# Patient Record
Sex: FEMALE | Race: WHITE | NOT HISPANIC OR LATINO | Employment: FULL TIME | URBAN - METROPOLITAN AREA
[De-identification: names, ages, dates, MRNs, and addresses within clinical notes are randomized per-mention and may not be internally consistent; named-entity substitution may affect disease eponyms.]

---

## 2017-05-18 ENCOUNTER — ALLSCRIPTS OFFICE VISIT (OUTPATIENT)
Dept: OTHER | Facility: OTHER | Age: 42
End: 2017-05-18

## 2018-01-12 NOTE — PROCEDURES
Chief Complaint  left heel pain - here for injection - [facility]      Current Meds   1  Atenolol 25 MG Oral Tablet; Therapy: 21Jan2016 to Recorded   2  Dicyclomine HCl - 10 MG Oral Capsule; TAKE 1 CAPSULE EVERY 6 HOURS AS   NEEDED; Therapy: [id number] to (Last Rx:03Apr2015)  Requested for: 03Apr2015 Ordered    Allergies    1  Aciphex TBEC   2  Chantix TABS   3  NSAIDs   4  Penicillins    Vitals  Signs [Data Includes: Current Encounter]    Heart Rate: 76, L Radial  Pulse Quality: Regular  Systolic: 343, LUE, Sitting  Diastolic: 80, LUE, Sitting  Height: 5 ft 6 5 in  Weight: 161 lb   BMI Calculated: 25 6  BSA Calculated: 1 83    Procedure    Procedure:  Injection Of Trigger Point(s)  The injection was on the left  Indication: pain and Bone spur of the left calcaneus  Risks were discussed with the  Verbal consent was obtained prior to the procedure  The patient was premedicated with Ethyl chloride-topical    The site was prepped with Alcohol  Locations:   Procedure Note: The needle size was 21G x 5/8 inch  Anesthesia: The wound was anesthetized with i/2 ml ml of lidocaine 2%  Dressing:  a sterile dressing was placed  Patient Status:  the patient tolerated the procedure well  Complications:  there were no complications  Assessment    1  Heel spur (659 73) (M77 30)    Plan  Heel spur    · Injection Trigger Point 1 or 2 Muscles - POC; Status:Complete;   Done: [id number]  Irritable bowel syndrome    · Dicyclomine HCl - 10 MG Oral Capsule; TAKE 1 CAPSULE EVERY 6 HOURS AS  NEEDED    Discussion/Summary    #1  Heel spur left foot-injected with 20 mg of Kenalog and lidocaine  Followup 2 weeks if still bothering her        Signatures   Electronically signed by : Carlyle Valadez, [facility]; Jan 28 2016  1:47PM EST                       (Author)    Electronically signed by : Ruby Ferris DO; Jan 28 2016  2:22PM EST

## 2018-01-14 VITALS
HEIGHT: 67 IN | TEMPERATURE: 99.7 F | BODY MASS INDEX: 26.02 KG/M2 | HEART RATE: 96 BPM | WEIGHT: 165.8 LBS | SYSTOLIC BLOOD PRESSURE: 120 MMHG | DIASTOLIC BLOOD PRESSURE: 76 MMHG

## 2018-02-03 ENCOUNTER — OFFICE VISIT (OUTPATIENT)
Dept: FAMILY MEDICINE CLINIC | Facility: CLINIC | Age: 43
End: 2018-02-03

## 2018-02-03 VITALS
HEIGHT: 67 IN | SYSTOLIC BLOOD PRESSURE: 102 MMHG | WEIGHT: 166.4 LBS | DIASTOLIC BLOOD PRESSURE: 66 MMHG | HEART RATE: 104 BPM | BODY MASS INDEX: 26.12 KG/M2 | TEMPERATURE: 98.3 F

## 2018-02-03 DIAGNOSIS — J40 BRONCHITIS: Primary | ICD-10-CM

## 2018-02-03 DIAGNOSIS — K58.9 IRRITABLE BOWEL SYNDROME, UNSPECIFIED TYPE: ICD-10-CM

## 2018-02-03 DIAGNOSIS — R05.9 COUGH: ICD-10-CM

## 2018-02-03 DIAGNOSIS — E05.90 HYPERTHYROIDISM: ICD-10-CM

## 2018-02-03 PROCEDURE — 99214 OFFICE O/P EST MOD 30 MIN: CPT | Performed by: FAMILY MEDICINE

## 2018-02-03 PROCEDURE — 94640 AIRWAY INHALATION TREATMENT: CPT | Performed by: FAMILY MEDICINE

## 2018-02-03 RX ORDER — AZITHROMYCIN 250 MG/1
250 TABLET, FILM COATED ORAL DAILY
Qty: 6 TABLET | Refills: 0 | Status: SHIPPED | OUTPATIENT
Start: 2018-02-03 | End: 2018-02-08

## 2018-02-03 RX ORDER — LEVALBUTEROL 1.25 MG/.5ML
1.25 SOLUTION, CONCENTRATE RESPIRATORY (INHALATION) EVERY 8 HOURS PRN
Status: SHIPPED | OUTPATIENT
Start: 2018-02-03

## 2018-02-03 RX ADMIN — LEVALBUTEROL 1.25 MG: 1.25 SOLUTION, CONCENTRATE RESPIRATORY (INHALATION) at 09:09

## 2018-02-03 NOTE — PROGRESS NOTES
Assessment/Plan:    Patient presents today with cough and upper respiratory symptoms  She is also wheezing  1   Bronchitis -Zithromax, Neti pot, plenty of fluids  May use over-the-counter Robitussin DM or Delsym if needed  2   Cough -reactive airway, in nature  Start Advair 1 puff b i d  Nebulizer given in office today  3   Hypothyroidism -continue atenolol  Her pulse is a little elevated today  4   Irritable bowel syndrome -stable at the present time  Subjective:      Patient ID: Demetrius Bedoya is a 38 y o  female  CC:  Cough and feeling "yucky" x 2 weeks  --bb    Patient states that 2 weeks ago she developed upper respiratory infection which slowly is moved to her chest   Current smoker  No documented temp  She has a hx of tachycardia but has not been using the atenolol  The following portions of the patient's history were reviewed and updated as appropriate: allergies, current medications, past family history, past medical history, past social history, past surgical history and problem list     Review of Systems   Constitutional:        See HPI   HENT: Positive for congestion and rhinorrhea  Negative for ear pain, mouth sores, sinus pressure and trouble swallowing  Eyes: Negative for discharge, redness and itching  Respiratory: Positive for cough  Negative for apnea, chest tightness, shortness of breath, wheezing and stridor  Cardiovascular: Negative for chest pain, palpitations and leg swelling  Gastrointestinal: Negative for abdominal distention, nausea and vomiting  Endocrine: Negative for cold intolerance and heat intolerance  Genitourinary: Negative for difficulty urinating, dysuria, flank pain and urgency  Musculoskeletal: Negative for arthralgias and myalgias  Skin: Negative for rash  Neurological: Negative for dizziness, seizures, syncope, speech difficulty, weakness, light-headedness, numbness and headaches  Hematological: Negative for adenopathy  Psychiatric/Behavioral: Negative for agitation, behavioral problems, confusion and sleep disturbance  The patient is not nervous/anxious  Objective:     Vitals:    02/03/18 0816   BP: 102/66   Pulse: 104   Temp: 98 3 °F (36 8 °C)            Physical Exam   Constitutional: She is oriented to person, place, and time  She appears well-developed and well-nourished  No distress  HENT:   Head: Normocephalic and atraumatic  Right Ear: External ear normal    Left Ear: External ear normal    Mouth/Throat: Oropharyngeal exudate present  Eyes: Conjunctivae and EOM are normal  Pupils are equal, round, and reactive to light  No scleral icterus  Neck: Normal range of motion  Neck supple  Cardiovascular: Normal rate, regular rhythm, normal heart sounds and intact distal pulses  Exam reveals no gallop and no friction rub  No murmur heard  Pulmonary/Chest: Effort normal  No respiratory distress  She has wheezes  She has no rales  She exhibits no tenderness  Occasional expiratory wheeze  Post nebulizer: improved air movement and no wheezes  Abdominal: Soft  Bowel sounds are normal    Musculoskeletal: Normal range of motion  Lymphadenopathy:     She has no cervical adenopathy  Neurological: She is alert and oriented to person, place, and time  She has normal reflexes  Skin: Skin is warm and dry  She is not diaphoretic  Psychiatric: She has a normal mood and affect   Her behavior is normal  Judgment and thought content normal

## 2018-02-03 NOTE — PATIENT INSTRUCTIONS
1   Start Z-Rickie  2  Start Advair inhaler -1 puff twice a day  Please ask the pharmacist to give instructions  3  Drink lots of fluids  4  You may use Robitussin DM or Delsym, over-the-counter as needed for cough    5   Please return to office if his symptoms worsen or persist

## 2018-02-09 DIAGNOSIS — J40 BRONCHITIS: Primary | ICD-10-CM

## 2018-02-09 RX ORDER — ALBUTEROL SULFATE 90 UG/1
2 AEROSOL, METERED RESPIRATORY (INHALATION) EVERY 6 HOURS PRN
Qty: 1 INHALER | Refills: 0 | Status: SHIPPED | OUTPATIENT
Start: 2018-02-09 | End: 2018-06-26

## 2018-06-26 ENCOUNTER — OFFICE VISIT (OUTPATIENT)
Dept: FAMILY MEDICINE CLINIC | Facility: CLINIC | Age: 43
End: 2018-06-26
Payer: COMMERCIAL

## 2018-06-26 VITALS
HEART RATE: 100 BPM | HEIGHT: 67 IN | DIASTOLIC BLOOD PRESSURE: 76 MMHG | TEMPERATURE: 99.9 F | WEIGHT: 173.2 LBS | BODY MASS INDEX: 27.18 KG/M2 | SYSTOLIC BLOOD PRESSURE: 116 MMHG

## 2018-06-26 DIAGNOSIS — F17.200 TOBACCO USE DISORDER: ICD-10-CM

## 2018-06-26 DIAGNOSIS — M77.50 TENDONITIS OF ANKLE OR FOOT: ICD-10-CM

## 2018-06-26 DIAGNOSIS — R05.9 COUGH: ICD-10-CM

## 2018-06-26 DIAGNOSIS — J01.90 ACUTE NON-RECURRENT SINUSITIS, UNSPECIFIED LOCATION: Primary | ICD-10-CM

## 2018-06-26 PROCEDURE — 99214 OFFICE O/P EST MOD 30 MIN: CPT | Performed by: FAMILY MEDICINE

## 2018-06-26 PROCEDURE — 3008F BODY MASS INDEX DOCD: CPT | Performed by: FAMILY MEDICINE

## 2018-06-26 RX ORDER — AZITHROMYCIN 500 MG/1
500 TABLET, FILM COATED ORAL DAILY
Qty: 3 TABLET | Refills: 0 | Status: SHIPPED | OUTPATIENT
Start: 2018-06-26 | End: 2018-06-29

## 2018-06-26 RX ORDER — BENZONATATE 200 MG/1
200 CAPSULE ORAL 3 TIMES DAILY
Qty: 30 CAPSULE | Refills: 1 | Status: SHIPPED | OUTPATIENT
Start: 2018-06-26 | End: 2018-10-16 | Stop reason: ALTCHOICE

## 2018-06-26 NOTE — ASSESSMENT & PLAN NOTE
She will be given a referral to see either Dr Francisco Javier Baeza or Dr Lorie Salazar here in the office

## 2018-06-26 NOTE — ASSESSMENT & PLAN NOTE
She was given a prescription for Tessalon Perles 200 mg 1 3 times a day number 30 with 1 refill and Delsym 2 tsp twice a day for her cough

## 2018-06-26 NOTE — PROGRESS NOTES
Assessment/Plan:    Acute non-recurrent sinusitis  She was given a prescription for Zithromax 500 mg 1 daily #3 , Coricidin HBP cold and flu 1 tablet twice a day and 2 tablets at bedtime  Cough  She was given a prescription for Tessalon Perles 200 mg 1 3 times a day number 30 with 1 refill and Delsym 2 tsp twice a day for her cough  Tendonitis of ankle or foot  She will be given a referral to see either Dr Annelise Barron or Dr Arsh Corcoran here in the office  Tobacco use disorder  She had quit smoking but now she started up again and she is currently still smoking  Diagnoses and all orders for this visit:    Acute non-recurrent sinusitis, unspecified location  -     azithromycin (ZITHROMAX) 500 MG tablet; Take 1 tablet (500 mg total) by mouth daily for 3 days    Cough  -     benzonatate (TESSALON) 200 MG capsule; Take 1 capsule (200 mg total) by mouth 3 (three) times a day    Tendonitis of ankle or foot  -     Ambulatory referral to Podiatry; Future    Tobacco use disorder          Subjective: C/O productive cough, mild sinus congestion, and nausea  Onset of symptoms was 6/22/18  Tried otc mucinex without relief  --bb     Patient ID: Guadalupe Griffin is a 37 y o  female  This is a 52-year-old female who comes in with a productive cough with sinus congestion and excessive postnasal drip for the past 5 days  She has been taking Mucinex with no help  She also has been using her albuterol inhaler but only once a day  She denies any vomiting or diarrhea but does have nausea from the constant drainage  Her blood pressure is 116/76 and her temperature is 99 9°  The following portions of the patient's history were reviewed and updated as appropriate: allergies, current medications, past family history, past medical history, past social history, past surgical history and problem list     Review of Systems   Constitutional: Positive for fever  Negative for fatigue     HENT: Positive for congestion, postnasal drip, rhinorrhea and sinus pressure  Negative for ear pain and sore throat  Respiratory: Positive for cough  Negative for shortness of breath and wheezing  Gastrointestinal: Positive for nausea  Negative for diarrhea and vomiting  Objective:      /76 (BP Location: Left arm, Patient Position: Sitting, Cuff Size: Standard)   Pulse 100   Temp 99 9 °F (37 7 °C) (Tympanic)   Ht 5' 7" (1 702 m)   Wt 78 6 kg (173 lb 3 2 oz)   BMI 27 13 kg/m²          Physical Exam   Constitutional: She is oriented to person, place, and time  She appears well-developed and well-nourished  HENT:   Head: Normocephalic  Right Ear: External ear normal    Mouth/Throat: No oropharyngeal exudate  Left ear canal with redness but no injection or erythema of the tympanic membrane  Positive postnasal drip, +2 erythema of posterior pharynx and excessive postnasal drip  Eyes: Conjunctivae and EOM are normal  Pupils are equal, round, and reactive to light  Neck: Normal range of motion  Neck supple  Cardiovascular: Normal rate, regular rhythm and normal heart sounds  Pulmonary/Chest: Effort normal and breath sounds normal  She has no wheezes  She has no rales  Abdominal: Soft  Bowel sounds are normal    Musculoskeletal: Normal range of motion  Lymphadenopathy:     She has no cervical adenopathy  Neurological: She is alert and oriented to person, place, and time  Skin: Skin is warm  Psychiatric: She has a normal mood and affect  Her behavior is normal  Judgment and thought content normal    Nursing note and vitals reviewed

## 2018-06-26 NOTE — ASSESSMENT & PLAN NOTE
She was given a prescription for Zithromax 500 mg 1 daily #3 , Coricidin HBP cold and flu 1 tablet twice a day and 2 tablets at bedtime

## 2018-10-16 ENCOUNTER — OFFICE VISIT (OUTPATIENT)
Dept: FAMILY MEDICINE CLINIC | Facility: CLINIC | Age: 43
End: 2018-10-16
Payer: COMMERCIAL

## 2018-10-16 VITALS
HEART RATE: 100 BPM | HEIGHT: 67 IN | WEIGHT: 176 LBS | BODY MASS INDEX: 27.62 KG/M2 | SYSTOLIC BLOOD PRESSURE: 112 MMHG | DIASTOLIC BLOOD PRESSURE: 70 MMHG | TEMPERATURE: 99.6 F

## 2018-10-16 DIAGNOSIS — N90.9 LESION OF FEMALE PERINEUM: Primary | ICD-10-CM

## 2018-10-16 PROCEDURE — 99213 OFFICE O/P EST LOW 20 MIN: CPT | Performed by: FAMILY MEDICINE

## 2018-10-16 PROCEDURE — 3008F BODY MASS INDEX DOCD: CPT | Performed by: FAMILY MEDICINE

## 2018-10-16 NOTE — PROGRESS NOTES
Assessment/Plan:    Lesion of female perineum  Draining cyst of clitoral area       Diagnoses and all orders for this visit:    Lesion of female perineum  -     Ambulatory referral to Gynecology; Future          Subjective:   Pt states she has cellulitis clitoral area 3 times in the past month  It is not resolving  She states she was treated for this previously with antibiotic   - [facility]     Patient ID: Luz Mosher is a 37 y o  female  This is a 18-year-old female who comes in with a recurring cyst in the clitoral area of the perineum  She has been put on an antibiotic and the cyst resolves involving the drainage but does not completely resolved  She has a blood pressure of 112/70 and her temperature is 99 6°  Dr Suzette Penaloza examined her and thought that it was a cyst which needs to be surgically taking care of and a gyn referral was in order  The following portions of the patient's history were reviewed and updated as appropriate: allergies, current medications, past family history, past medical history, past social history, past surgical history and problem list     Review of Systems   Constitutional: Negative  HENT: Negative  Eyes: Negative  Respiratory: Negative  Cardiovascular: Negative  Gastrointestinal: Negative  Endocrine: Negative  Genitourinary: Positive for genital sores  Negative for difficulty urinating, dyspareunia, dysuria and enuresis  Musculoskeletal: Negative  Skin: Negative  Allergic/Immunologic: Negative  Neurological: Negative  Hematological: Negative  Psychiatric/Behavioral: Negative  Objective:      /70 (BP Location: Left arm, Patient Position: Sitting, Cuff Size: Large)   Pulse 100   Temp 99 6 °F (37 6 °C) (Oral)   Ht 5' 7" (1 702 m)   Wt 79 8 kg (176 lb)   BMI 27 57 kg/m²          Physical Exam   Genitourinary:   Genitourinary Comments: Cyst located in the clitoral area with no draining at the present time     Nursing note and vitals reviewed

## 2018-10-25 ENCOUNTER — OFFICE VISIT (OUTPATIENT)
Dept: OBGYN CLINIC | Facility: MEDICAL CENTER | Age: 43
End: 2018-10-25
Payer: COMMERCIAL

## 2018-10-25 VITALS — BODY MASS INDEX: 27.88 KG/M2 | WEIGHT: 178 LBS | DIASTOLIC BLOOD PRESSURE: 76 MMHG | SYSTOLIC BLOOD PRESSURE: 122 MMHG

## 2018-10-25 DIAGNOSIS — N90.89 VULVAR LESION: Primary | ICD-10-CM

## 2018-10-25 PROCEDURE — 99203 OFFICE O/P NEW LOW 30 MIN: CPT | Performed by: OBSTETRICS & GYNECOLOGY

## 2019-07-30 ENCOUNTER — OFFICE VISIT (OUTPATIENT)
Dept: FAMILY MEDICINE CLINIC | Facility: CLINIC | Age: 44
End: 2019-07-30
Payer: COMMERCIAL

## 2019-07-30 VITALS
BODY MASS INDEX: 27.31 KG/M2 | WEIGHT: 174 LBS | HEART RATE: 80 BPM | DIASTOLIC BLOOD PRESSURE: 84 MMHG | SYSTOLIC BLOOD PRESSURE: 120 MMHG | HEIGHT: 67 IN | TEMPERATURE: 99.2 F

## 2019-07-30 DIAGNOSIS — Z12.39 BREAST CANCER SCREENING: Primary | ICD-10-CM

## 2019-07-30 DIAGNOSIS — R10.31 RIGHT LOWER QUADRANT ABDOMINAL PAIN: ICD-10-CM

## 2019-07-30 PROCEDURE — 99213 OFFICE O/P EST LOW 20 MIN: CPT | Performed by: FAMILY MEDICINE

## 2019-07-30 PROCEDURE — 3008F BODY MASS INDEX DOCD: CPT | Performed by: FAMILY MEDICINE

## 2019-07-30 NOTE — ASSESSMENT & PLAN NOTE
The patient will go to the emergency room for further evaluation and treatment for a possible appendicitis

## 2019-07-30 NOTE — PATIENT INSTRUCTIONS

## 2019-07-30 NOTE — PROGRESS NOTES
Assessment and Plan:  Patient to go to ER for further evaluation and treatment  Problem List Items Addressed This Visit        Other    Right lower quadrant abdominal pain     The patient will go to the emergency room for further evaluation and treatment for a possible appendicitis  Relevant Orders    Transfer to other facility      Other Visit Diagnoses     Breast cancer screening    -  Primary    Relevant Orders    Mammo screening bilateral w 3d & cad    BMI 27 0-27 9,adult                     Diagnoses and all orders for this visit:    Breast cancer screening  -     Mammo screening bilateral w 3d & cad; Future    Right lower quadrant abdominal pain  -     Transfer to other facility    BMI 27 0-27 9,adult              Subjective:      Patient ID: Madelin Lombardo is a 53 y o  female  CC:    Chief Complaint   Patient presents with    Nausea    Abdominal Pain    Fatigue     Symptoms started Sunday  Pt notes she is very thirsty, bloated  -  [facility]       HPI:    This is a 49-year-old female who comes in after several days of abdominal pain which was very intense and severe for the 1st 2 days and is still painful but not quite as bad  The pain is located in the right lower quadrant of her abdomen  She is also complaining of fatigue and being thirsty and her abdomen being bloated  She denies any nausea or vomiting and has no appetite  Her blood pressure is 120/84 and her temperature is 99 2°  Her weight is down 4 lb from the previous visit to 174 lb  The following portions of the patient's history were reviewed and updated as appropriate: allergies, current medications, past family history, past medical history, past social history, past surgical history and problem list       Review of Systems   Constitutional: Positive for fatigue  Mild temperature elevation   HENT: Negative  Eyes: Negative  Respiratory: Negative  Negative for chest tightness  Cardiovascular: Negative  Negative for chest pain  Gastrointestinal: Positive for abdominal distention, abdominal pain and nausea  Negative for anal bleeding, blood in stool, constipation, diarrhea, rectal pain and vomiting  Right lower quadrant abdominal pain   Endocrine: Negative  Genitourinary: Negative  Musculoskeletal: Negative  Skin: Negative  Allergic/Immunologic: Negative  Neurological: Negative  Hematological: Negative  Psychiatric/Behavioral: Negative  Data to review:       Objective:    Vitals:    07/30/19 1408   BP: 120/84   BP Location: Left arm   Patient Position: Sitting   Cuff Size: Large   Pulse: 80   Temp: 99 2 °F (37 3 °C)   TempSrc: Oral   Weight: 78 9 kg (174 lb)   Height: 5' 7" (1 702 m)        Physical Exam   Constitutional: She is oriented to person, place, and time  She appears well-developed and well-nourished  HENT:   Head: Normocephalic  Right Ear: External ear normal    Left Ear: External ear normal    Mouth/Throat: Oropharynx is clear and moist    Eyes: Pupils are equal, round, and reactive to light  Conjunctivae and EOM are normal    Neck: Normal range of motion  Neck supple  Cardiovascular: Normal rate, regular rhythm and normal heart sounds  Pulmonary/Chest: Effort normal and breath sounds normal    Abdominal: Soft  Bowel sounds are normal  There is tenderness in the right lower quadrant  There is rebound and guarding  There is negative Rucker's sign  Musculoskeletal: Normal range of motion  Neurological: She is alert and oriented to person, place, and time  Skin: Skin is warm  Psychiatric: She has a normal mood and affect  Her behavior is normal  Judgment and thought content normal          BMI Counseling: Body mass index is 27 25 kg/m²  Discussed the patient's BMI with her  The BMI is above average  BMI counseling and education was provided to the patient   Nutrition recommendations include reducing portion sizes, decreasing overall calorie intake, 3-5 servings of fruits/vegetables daily, reducing fast food intake, consuming healthier snacks, decreasing soda and/or juice intake, moderation in carbohydrate intake, increasing intake of lean protein and reducing intake of cholesterol  Exercise recommendations include moderate aerobic physical activity for 150 minutes/week

## 2021-02-25 ENCOUNTER — TELEMEDICINE (OUTPATIENT)
Dept: FAMILY MEDICINE CLINIC | Facility: CLINIC | Age: 46
End: 2021-02-25
Payer: COMMERCIAL

## 2021-02-25 VITALS — HEIGHT: 67 IN | WEIGHT: 160 LBS | BODY MASS INDEX: 25.11 KG/M2

## 2021-02-25 DIAGNOSIS — E05.90 HYPERTHYROIDISM: ICD-10-CM

## 2021-02-25 DIAGNOSIS — E04.1 NONTOXIC SINGLE THYROID NODULE: ICD-10-CM

## 2021-02-25 DIAGNOSIS — Z12.31 ENCOUNTER FOR SCREENING MAMMOGRAM FOR MALIGNANT NEOPLASM OF BREAST: ICD-10-CM

## 2021-02-25 DIAGNOSIS — L24.9 IRRITANT CONTACT DERMATITIS, UNSPECIFIED TRIGGER: Primary | ICD-10-CM

## 2021-02-25 PROBLEM — R10.31 RIGHT LOWER QUADRANT ABDOMINAL PAIN: Status: RESOLVED | Noted: 2019-07-30 | Resolved: 2021-02-25

## 2021-02-25 PROBLEM — J01.90 ACUTE NON-RECURRENT SINUSITIS: Status: RESOLVED | Noted: 2018-06-26 | Resolved: 2021-02-25

## 2021-02-25 PROBLEM — R05.9 COUGH: Status: RESOLVED | Noted: 2018-06-26 | Resolved: 2021-02-25

## 2021-02-25 PROCEDURE — 3008F BODY MASS INDEX DOCD: CPT | Performed by: NURSE PRACTITIONER

## 2021-02-25 PROCEDURE — 99214 OFFICE O/P EST MOD 30 MIN: CPT | Performed by: NURSE PRACTITIONER

## 2021-02-25 PROCEDURE — 3725F SCREEN DEPRESSION PERFORMED: CPT | Performed by: NURSE PRACTITIONER

## 2021-02-25 RX ORDER — PREDNISONE 10 MG/1
TABLET ORAL
Qty: 15 TABLET | Refills: 0 | Status: SHIPPED | OUTPATIENT
Start: 2021-02-25 | End: 2021-03-02

## 2021-02-25 RX ORDER — HYDROXYZINE HYDROCHLORIDE 25 MG/1
25 TABLET, FILM COATED ORAL EVERY 6 HOURS PRN
Qty: 30 TABLET | Refills: 0 | Status: SHIPPED | OUTPATIENT
Start: 2021-02-25

## 2021-02-25 NOTE — PROGRESS NOTES
Virtual Regular Visit      Assessment/Plan:    Problem List Items Addressed This Visit        Endocrine    Hyperthyroidism     Patient was treated with medications and is no longer taking the medication  It has been some time since she has her TSH check  Will order labs  Relevant Medications    predniSONE 10 mg tablet    Other Relevant Orders    TSH, 3rd generation with Free T4 reflex    CBC and differential    Comprehensive metabolic panel    Lipid panel    US thyroid    Nontoxic single thyroid nodule     Patient last US was in 2014  She did have nodule  It has not been followed up in some times  Will re order this  Relevant Medications    predniSONE 10 mg tablet    Other Relevant Orders    US thyroid      Other Visit Diagnoses     Irritant contact dermatitis, unspecified trigger    -  Primary    Relevant Medications    predniSONE 10 mg tablet    hydrOXYzine HCL (ATARAX) 25 mg tablet    Encounter for screening mammogram for malignant neoplasm of breast        Relevant Orders    Mammo screening bilateral w 3d & cad               Reason for visit is   Chief Complaint   Patient presents with    Rash     Pt complaints of a rash all over her body for the past week  Patien describes it as bumpy and itchy   Virtual Regular Visit        Encounter provider YOUNG Clark    Provider located at [address] [place] [address] PRIMARY CARE  [facility] [address]      Recent Visits  No visits were found meeting these conditions  Showing recent visits within past 7 days and meeting all other requirements     Today's Visits  Date Type Provider Dept   02/25/21 Telemedicine YOUNG Mann [facility]   Showing today's visits and meeting all other requirements     Future Appointments  No visits were found meeting these conditions     Showing future appointments within next 150 days and meeting all other requirements        The patient was identified by name and date of birth  Keenan Bourne was informed that this is a telemedicine visit and that the visit is being conducted through [facility] [place] and patient was informed that this is not a secure, HIPAA-compliant platform  She agrees to proceed     My office door was closed  No one else was in the room  She acknowledged consent and understanding of privacy and security of the video platform  The patient has agreed to participate and understands they can discontinue the visit at any time  Patient is aware this is a billable service  Subjective  Keenan Bourne is a 50 y o  female    Patient reports she started with a rash a week ago she has really sensitive skin  She reports its started on her neck and now its down her back, buttocks and own her arms  She reports she has been using cortisone cream, benadryl, zinc oxide all without relief  The rash is very itchy          Past Medical History:   Diagnosis Date    Anxiety     LAST ASSESSED: [id number]    Bleeding hemorrhoids     LAST ASSESSED: [id number]    Chronic pharyngitis     LAST ASSESSED: [id number]    Conjunctival hemorrhage     LAST ASSESSED: [id number]    Eczema     LAST ASSESSED: [id number]    Irritable bowel     Irritable bowel syndrome     LAST ASSESSED: [id number]    Palpitations     Polyp of sigmoid colon     LAST ASSESSED: [id number]    Syncope     LAST ASSESSED: [id number]       Past Surgical History:   Procedure Laterality Date    COMBINED AUGMENTATION MAMMAPLASTY AND ABDOMINOPLASTY      HYSTERECTOMY  08/28/2014       Current Outpatient Medications   Medication Sig Dispense Refill    hydrOXYzine HCL (ATARAX) 25 mg tablet Take 1 tablet (25 mg total) by mouth every 6 (six) hours as needed for itching 30 tablet 0    predniSONE 10 mg tablet Take 5 tablets (50 mg total) by mouth daily for 1 day, THEN 4 tablets (40 mg total) daily for 1 day, THEN 3 tablets (30 mg total) daily for 1 day, THEN 2 tablets (20 mg total) daily for 1 day, THEN 1 tablet (10 mg total) daily for 1 day  15 tablet 0     Current Facility-Administered Medications   Medication Dose Route Frequency Provider Last Rate Last Admin    levalbuterol (XOPENEX) inhalation solution 1 25 mg  1 25 mg Nebulization Q8H PRN Jd Alfaro MD   1 25 mg at 02/03/18 0909        Allergies   Allergen Reactions    Rabeprazole Shortness Of Breath and Tachycardia     Other reaction(s): Hypotension, Tremor, Vomiting    Chantix  [Varenicline] Nausea Only     Other reaction(s): Anger    Nsaids      Annotation - [id number]: Avoid due to gastritis, 2004    Penicillins Hives       Review of Systems   Constitutional: Negative  Respiratory: Negative  Cardiovascular: Positive for palpitations  Skin: Positive for rash  Video Exam    Vitals:    02/25/21 1303   Weight: 72 6 kg (160 lb)   Height: 5' 7" (1 702 m)       Physical Exam  Constitutional:       General: She is not in acute distress  Appearance: She is well-developed  She is not diaphoretic  HENT:      Head: Normocephalic and atraumatic  Right Ear: External ear normal       Left Ear: External ear normal    Eyes:      Conjunctiva/sclera: Conjunctivae normal    Pulmonary:      Effort: Pulmonary effort is normal  No respiratory distress  Skin:     Findings: Rash present  Rash is papular (neck and back) and vesicular  Comments: Consistent with eczema    Neurological:      Mental Status: She is alert and oriented to person, place, and time  Psychiatric:         Thought Content: Thought content normal           I spent 9 minutes directly with the patient during this visit      VIRTUAL VISIT DISCLAIMER    Keenan Bourne acknowledges that she has consented to an online visit or consultation   She understands that the online visit is based solely on information provided by her, and that, in the absence of a face-to-face physical evaluation by the physician, the diagnosis she receives is both limited and provisional in terms of accuracy and completeness  This is not intended to replace a full medical face-to-face evaluation by the physician  Cortney Brandt understands and accepts these terms

## 2021-02-25 NOTE — ASSESSMENT & PLAN NOTE
Patient last US was in 2014  She did have nodule  It has not been followed up in some times  Will re order this

## 2021-02-25 NOTE — ASSESSMENT & PLAN NOTE
Patient was treated with medications and is no longer taking the medication  It has been some time since she has her TSH check  Will order labs

## 2021-04-06 PROCEDURE — 88305 TISSUE EXAM BY PATHOLOGIST: CPT | Performed by: STUDENT IN AN ORGANIZED HEALTH CARE EDUCATION/TRAINING PROGRAM

## 2021-04-07 ENCOUNTER — LAB REQUISITION (OUTPATIENT)
Dept: LAB | Facility: HOSPITAL | Age: 46
End: 2021-04-07
Payer: COMMERCIAL

## 2021-04-07 DIAGNOSIS — C44.519 BASAL CELL CARCINOMA OF SKIN OF OTHER PART OF TRUNK: ICD-10-CM

## 2021-04-29 ENCOUNTER — TELEMEDICINE (OUTPATIENT)
Dept: FAMILY MEDICINE CLINIC | Facility: CLINIC | Age: 46
End: 2021-04-29
Payer: COMMERCIAL

## 2021-04-29 DIAGNOSIS — U07.1 COVID-19: Primary | ICD-10-CM

## 2021-04-29 PROCEDURE — 99212 OFFICE O/P EST SF 10 MIN: CPT | Performed by: PHYSICIAN ASSISTANT

## 2021-04-29 NOTE — PROGRESS NOTES
COVID-19 Outpatient Progress Note    Assessment/Plan:    Problem List Items Addressed This Visit     None      Visit Diagnoses     COVID-19    -  Primary         Disposition:     Assessment/plan:  1  COVID-19 infection    I have spent 15 minutes directly with the patient  Assessment/plan:  1  COVID-19 infection-patient with symptom onset on the 17th of April, testing positive on the 19th of April  Her symptoms have mostly resolved several days ago  She has a very mild residual cough and loss of sense of taste or smell yet  She did not have any significant difficulties such as shortness of breath or difficulty breathing during her illness  I do believe she will be safe to return to work on Monday 5/3/2021  Note will be provided  Encounter provider Li Colón PA-C    Provider located at [address] [place] [address] PRIMARY CARE  Erwinlizzy PAYNE  [address]    Recent Visits  No visits were found meeting these conditions  Showing recent visits within past 7 days and meeting all other requirements     Today's Visits  Date Type Provider Dept   04/29/21 Telemedicine Li Colón PA-C [facility]   Showing today's visits and meeting all other requirements     Future Appointments  No visits were found meeting these conditions  Showing future appointments within next 150 days and meeting all other requirements        Patient agrees to participate in a virtual check in via telephone or video visit instead of presenting to the office to address urgent/immediate medical needs  Patient is aware this is a billable service  After connecting through Telephone, the patient was identified by name and date of birth  Luz Mosher was informed that this was a telemedicine visit and that the exam was being conducted confidentially over secure lines  My office door was closed  No one else was in the room   Luz Mosher acknowledged consent and understanding of privacy and security of the telemedicine visit  I informed the patient that I have reviewed her record in Epic and presented the opportunity for her to ask any questions regarding the visit today  The patient agreed to participate  Subjective:   Nataliia Ham is a 50 y o  female who is concerned about COVID-19  Patient is currently asymptomatic  Patient's symptoms include anosmia and loss of taste  Patient denies fever, chills, fatigue, malaise, congestion, rhinorrhea, sore throat, cough, shortness of breath, chest tightness, abdominal pain, nausea, vomiting, diarrhea, myalgias and headaches  Date of symptom onset: 4/17/2021    HPI:  This is a 55-year-old female who presents via virtual telephone visit for follow-up of COVID-19 infection  She tested positive at [facility] on the 4/19/2021  She did initially have some cold-like symptoms as well as cough, muscle aches, headache, nausea, and fatigue  She also started with some loss of taste and smell which has not returned yet  Otherwise she is feeling completely better from her symptoms except for a very mild residual cough      No results found for: NISREEN Guerrero [address]  Past Medical History:   Diagnosis Date    Anxiety     LAST ASSESSED: [id number]    Bleeding hemorrhoids     LAST ASSESSED: [id number]    Chronic pharyngitis     LAST ASSESSED: [id number]    Conjunctival hemorrhage     LAST ASSESSED: [id number]    Eczema     LAST ASSESSED: 06VNP5545    Irritable bowel     Irritable bowel syndrome     LAST ASSESSED: [id number]    Palpitations     Polyp of sigmoid colon     LAST ASSESSED: [id number]    Syncope     LAST ASSESSED: [id number]     Past Surgical History:   Procedure Laterality Date    COMBINED AUGMENTATION MAMMAPLASTY AND ABDOMINOPLASTY      HYSTERECTOMY  08/28/2014     Current Outpatient Medications   Medication Sig Dispense Refill    hydrOXYzine HCL (ATARAX) 25 mg tablet Take 1 tablet (25 mg total) by mouth every 6 (six) hours as needed for itching 30 tablet 0     Current Facility-Administered Medications   Medication Dose Route Frequency Provider Last Rate Last Admin    levalbuterol (XOPENEX) inhalation solution 1 25 mg  1 25 mg Nebulization Q8H PRN Cheri Mabry MD   1 25 mg at 02/03/18 0909     Allergies   Allergen Reactions    Rabeprazole Shortness Of Breath and Tachycardia     Other reaction(s): Hypotension, Tremor, Vomiting    Chantix  [Varenicline] Nausea Only     Other reaction(s): Anger    Nsaids      Annotation - [id number]: Avoid due to gastritis, 2004    Penicillins Hives       Review of Systems   Constitutional: Negative for chills, fatigue and fever  HENT: Negative for congestion, rhinorrhea and sore throat  Respiratory: Negative for cough, chest tightness and shortness of breath  Gastrointestinal: Negative for abdominal pain, diarrhea, nausea and vomiting  Musculoskeletal: Negative for myalgias  Neurological: Negative for headaches  Objective: There were no vitals filed for this visit  Physical Exam  Constitutional:       General: She is not in acute distress  Pulmonary:      Effort: No respiratory distress  Neurological:      Mental Status: She is alert and oriented to person, place, and time  Psychiatric:         Mood and Affect: Mood normal          Thought Content: Thought content normal          Judgment: Judgment normal        VIRTUAL VISIT DISCLAIMER    Mason Artist acknowledges that she has consented to an online visit or consultation  She understands that the online visit is based solely on information provided by her, and that, in the absence of a face-to-face physical evaluation by the physician, the diagnosis she receives is both limited and provisional in terms of accuracy and completeness  This is not intended to replace a full medical face-to-face evaluation by the physician  Mumtaz Brandt understands and accepts these terms

## 2021-04-29 NOTE — LETTER
April 29, 2021     Patient: Jamey Brandt   YOB: 1975   Date of Visit: 4/29/2021       To Whom it May Concern:    Corrie Escobedo is under my professional care  She was seen via virtual visit on 4/29/2021  She is improving from symptoms of COVID-19 infection and may return to work on Monday 5/3/2021 without restriction  She is no longer infectious to others  If you have any questions or concerns, please don't hesitate to call           Sincerely,          Joy Mittal PA-C        CC: No Recipients

## 2021-06-19 NOTE — PROGRESS NOTES
Dorys Eubanks was seen today for consult  Diagnoses and all orders for this visit:    Vulvar lesion  -     Genital Comprehensive Culture         Plan  Warm compresses and topical, triple antibiotic  If culture shows infection, will treat with antibiotics  Danny   Aisha Helm is a 37 y o  female here for a problem visit  Patient is complaining of vulvar lump  Sx's started beginning of October  Has a prior history of similar lesions in the past   Started age 10 and would occur every 1-5 years  States this lump in on the left side of labia majora near clitoris  Denies pain, bleeding, discharge and draining  Patient Active Problem List   Diagnosis    Genital herpes simplex    Hyperthyroidism    Irritable bowel syndrome    Nontoxic single thyroid nodule    Palpitations    Thyrotoxicosis    Tachycardia    Tobacco use disorder    Acute non-recurrent sinusitis    Cough    Tendonitis of ankle or foot    Lesion of female perineum       Gynecologic History  No LMP recorded  Patient has had a hysterectomy      Past Medical History:   Diagnosis Date    Anxiety     LAST ASSESSED: [id number]    Bleeding hemorrhoids     LAST ASSESSED: [id number]    Chronic pharyngitis     LAST ASSESSED: [id number]    Conjunctival hemorrhage     LAST ASSESSED: 17VLT8426    Eczema     LAST ASSESSED: 61SKE6624    Irritable bowel     Irritable bowel syndrome     LAST ASSESSED: 14SRY2395    Palpitations     Polyp of sigmoid colon     LAST ASSESSED: 60IKZ5156    Syncope     LAST ASSESSED: 52LZG9581     Past Surgical History:   Procedure Laterality Date    COMBINED AUGMENTATION MAMMAPLASTY AND ABDOMINOPLASTY      HYSTERECTOMY  08/28/2014     Family History   Problem Relation Age of Onset    Diabetes Family      Social History     Social History    Marital status:      Spouse name: N/A    Number of children: N/A    Years of education: N/A     Occupational History    OFFICE WORK      Social History Main Topics    Smoking status: Current Every Day Smoker     Packs/day: 0 50     Last attempt to quit: 1/1/2016    Smokeless tobacco: Never Used    Alcohol use No    Drug use: No    Sexual activity: Not Currently     Other Topics Concern    Not on file     Social History Narrative    No narrative on file     Allergies   Allergen Reactions    Rabeprazole Shortness Of Breath and Tachycardia     Other reaction(s): Hypotension, Tremor, Vomiting    Chantix  [Varenicline] Nausea Only     Other reaction(s): Anger    Nsaids      Annotation - [id number]: Avoid due to gastritis, 2004    Penicillins Hives     No current outpatient prescriptions on file  Current Facility-Administered Medications:     levalbuterol (XOPENEX) inhalation solution 1 25 mg, 1 25 mg, Nebulization, Q8H PRN, Jocelynn Donaldson MD, 1 25 mg at 02/03/18 0909    Review of Systems  Constitutional :no fever, feels well, no tiredness, no recent weight gain or loss  ENT: no ear ache, no loss of hearing, no nosebleeds or nasal discharge, no sore throat or hoarseness  Cardiovascular: no complaints of slow or fast heart beat, no chest pain, no palpitations, no leg claudication or lower extremity edema  Respiratory: no complaints of shortness of shortness of breath, no CALHOUN  Breasts:no complaints of breast pain, breast lump, or nipple discharge  Gastrointestinal: no complaints of abdominal pain, constipation, nausea, vomiting, or diarrhea or bloody stools  Genitourinary : no complaints of dysuria, incontinence, pelvic pain, no dysmenorrhea, vaginal discharge or abnormal vaginal bleeding and as noted in HPI  Musculoskeletal: no complaints of arthralgia, no myalgia, no joint swelling or stiffness, no limb pain or swelling    Integumentary: no complaints of skin rash or lesion, itching or dry skin  Neurological: no complaints of headache, no confusion, no numbness or tingling, no dizziness or fainting     Objective     /76   Wt 80 7 kg (178 lb)   BMI 27 88 kg/m²     General:   appears stated age, cooperative, alert normal mood and affect   Neck: normal, supple,trachea midline, no masses   Heart: regular rate and rhythm, S1, S2 normal, no murmur, click, rub or gallop   Lungs: clear to auscultation bilaterally   Abdomen: soft, non-tender, without masses or organomegaly   Vulva: 5 mm cyst on vulva near clitoris, spontaneous draining purulent discharge, cultured    Vagina: normal vagina, no discharge, exudate, lesion, or erythema   Urethra: normal   Cervix: Not evaluated   Uterus: Not evaluated   Adnexa: Not evaluated   Lymphatic palpation of lymph nodes in neck, axilla, groin and/or other locations: no lymphadenopathy or masses noted   Skin normal skin turgor and no rashes     Psychiatric orientation to person, place, and time: normal  mood and affect: normal Right arm;

## 2022-08-22 ENCOUNTER — EVALUATION (OUTPATIENT)
Dept: PHYSICAL THERAPY | Facility: REHABILITATION | Age: 47
End: 2022-08-22
Payer: COMMERCIAL

## 2022-08-22 DIAGNOSIS — R39.15 URINARY URGENCY: ICD-10-CM

## 2022-08-22 DIAGNOSIS — N39.41 URGE URINARY INCONTINENCE: ICD-10-CM

## 2022-08-22 DIAGNOSIS — N81.83 WEAKENING OF RECTOVAGINAL TISSUE: Primary | ICD-10-CM

## 2022-08-22 PROCEDURE — 97530 THERAPEUTIC ACTIVITIES: CPT | Performed by: PHYSICAL THERAPIST

## 2022-08-22 PROCEDURE — 97162 PT EVAL MOD COMPLEX 30 MIN: CPT | Performed by: PHYSICAL THERAPIST

## 2022-08-22 NOTE — LETTER
2022    DO Luis M Alan [address] [facility] [address]    Patient: Kyler Sánchez   YOB: 1975   Date of Visit: 2022     Encounter Diagnosis     ICD-10-CM    1  Weakening of rectovaginal tissue  N81 83    2  Urinary urgency  R39 15    3  Urge urinary incontinence  N39 41        Dear Dr Brayan Desai: Thank you for your recent referral of Kyler Sánchez  Please review the attached evaluation summary from Rhonda's recent visit  Please verify that you agree with the plan of care by signing the attached order  If you have any questions or concerns, please do not hesitate to call  I sincerely appreciate the opportunity to share in the care of one of your patients and hope to have another opportunity to work with you in the near future  Sincerely,    Jass Núñez, PT      Referring Provider:      I certify that I have read the below Plan of Care and certify the need for these services furnished under this plan of treatment while under my care  DO Luis M Alan [phone] [facility] [address]  Via Fax: [phone]          PT Evaluation     Today's date: 2022  Patient name: Kyler Sánchez  : 1975  MRN: [id number]  Referring provider: Monica Lake DO  Dx:   Encounter Diagnosis     ICD-10-CM    1  Weakening of rectovaginal tissue  N81 83    2  Urinary urgency  R39 15    3  Urge urinary incontinence  N39 41        Start Time: 1705  Stop Time: 1800  Total time in clinic (min): 55 minutes    Assessment  Assessment details: Kyler Sánchez is a 50 y o  female who presents with concerns of  Urinary urgency, urge incontinence and mild pelvic organ prolapse  Assessment reveals pfm weakness with poor holding endurance, poor knowledge of protective strategies, and difficulty volitionally relaxing LA   Patient presents with the below outlined deficits and is appropriate for skilled physical therapy in order to address deficits and ultimately meet goal of independent self management of condition  Therapeutic activities performed upon examination included education regarding pelvic floor anatomy, explanation of exam technique, explanation of exam findings and discussion of treatment plan as well as expectations of the patient to emphasize the importance of compliance and adherence to physical therapy visits  Impairments: abnormal muscle tone, activity intolerance, impaired physical strength and lacks appropriate home exercise program    Goals  STGs to be met in 4 weeks:  * Patient will be compliant with introductory HEP as prescribed  * Patient presents with good understanding of pelvic floor protective strategies to reduce intra-abdominal pressure against pelvic organs  * Patient will demonstrate and report good postural techniques with toileting  LTGs to be met by discharge:  * Patient will present with a  50% improvement on FOTO score by discharge to indicate improved symptom management  * Patient reports that she is able to successfully suppress an urge to empty her bladder for 20' without leakage  * Normalize sEMG findings to indicate strength average > 12uV and resting average < 2 5uV  * Presents with improved nocturia to 1 toiletings/night for more thorough sleep  * Demonstrates correct isolation and relaxation of pelvic floor to palpation without overflow from global stabilizers  * Patient will use pelvic floor muscles correctly during functional ADLs such as coughing, sneezing, lifting and exercise activities to avoid excessive IAP and PFM strain  * Patient will be compliant with comprehensive home exercise program for self management of condition           Plan  Patient would benefit from: skilled physical therapy  Referral necessary: No  Planned modality interventions: biofeedback  Planned therapy interventions: manual therapy, neuromuscular re-education, patient education, strengthening, therapeutic activities, therapeutic exercise, home exercise program and breathing training  Frequency: 1x week  Duration in weeks: 12  Plan of Care beginning date: 8/22/2022  Plan of Care expiration date: 11/14/2022  Treatment plan discussed with: patient        PT Pelvic Floor Subjective:   History of Present Illness:   Patient reports that she has had "years" bladder urgency but in the past year this has worsened with urge leakage  She notes frequency has also increased and she has very little tolerance to coffee or alcohol with bladder urgency  ("I timed it and it's every 7 minutes if I drink coffee or alcohol ") She also feel a "bubble" rectally with flatulence       Mechanism of injury: childbirth          Recurrent probem    Quality of life: good    Social Support:     Relationship status: domestic partnership    Work status: employed full time ( at [employer])    Life stress severity: mild and moderate  Hand dominance:  Right  Diet and Exercise:    Diet:balanced nutrition    Walks 1 1/2 miles daily  Home exercises   OB/ gyn History    Gestational History:     Prior Pregnancy: Yes      Number of prior pregnancies: 1    Number of term pregnancies: 1    Delivery Type: vaginal delivery      Delivery Complications:  45/31/7434 - 8 lb 8oz, tearing with repair, forceps delivery     Menstrual History:      Menstrual irregularities irregular menses  hormone replacement therapy  Partial hysterectomy in 2014 - HPV and cervical scar tissue   Estradiol patches are changed twice weekly - prescribed in November 2021   Bladder Function:     Voiding Difficulties positive for: urgency, hesitancy and incomplete emptying      Voiding Difficulties comments:     Voiding frequency: every 1-2 hours and every 3-4 hours    Urinary leakage: urine leakage    Urinary leakage aggravated by: walking to the bathroom and 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    Nocturia (episodes per night): 1, 2, 3 and 4 (can fall back to sleep easily)    Painful urination: No      Intake (ounces): Water intake (oz): 120 ounces  Bowel Function:     Bowel Function comments:  Reports recent marked improvement in bowels  She has dealt with years of diarrhea and in the past 6 months she has increased water intake, has increased fruit and vegetable intake and has moved to an intermittent fasting eating pattern  BMs are now formed, pain-free and with less strain  She was educated to utilize a squatty potty and expressed understanding  Bowel frequency: every 2 days and daily  Sexual Function:     Sexually Active:  Sexually active    Pain during intercourse: No      Lubrication Use: No      pain does not cause abstinence  Pain:     No pain reported by patient  Diagnostic Tests:     None    Treatments:     None    Patient Goals:     Patient goals for therapy:  Relaxation, return to sport/leisure activities, improved comfort and improved bladder or bowel function    Other patient goals:  Decrease bladder frequency and urgency      Objective   Pelvic Floor Exam   Position: supine exam    Diastatis   Diastasis recti present? no    General Perineum Exam:   perineum intact  Positive for descent and no pelvic organ prolapse at rest      General perineum exam comments: No discharge, irritation, organ prolapse or skin breakdown evident  Sensation intact throughout vaginal introitus  Very mild posterior POP evident with bearing down   No prolapse evident at rest          Visual Inspection of Perineum:   Excursion of perineal body in cephalad direction with contraction of pelvic floor muscles (PFM): fair   Excursion of perineal body in caudal direction with relaxation of pelvic floor muscles (PFM): weak  Cotton swab test: non-tender  Cough reflex: cough reflex  Sphincter Tone Resting: normal  Sphincter Tone Squeeze: normal    Pelvic Organ Prolapse   Position: hook-lying  At rest: none  With bearing down: mild (>1cm from hymenal remnants)  Location: posterior    Pelvic Floor Muscle Exam Palpation   No increased muscle tension in the pubococcygeus, iIliococcygeus and periurethral    Muscle Contraction: overflow  Breathing pattern with contraction: within normal limits  Pelvic floor muscle relaxation is incomplete     40% pelvic floor relaxation    PERFECT Score   Power right: 2+/5  Power left: 2+/5  Endurance (seconds to max): 4  Repetitions (before fatigue): 4    SMEG Biofeedback   to be assessed next treatment        Flowsheet Rows    Flowsheet Row Most Recent Value   PT/OT G-Codes    Current Score 13   Projected Score 0             Precautions: standard      Manuals 8/22                                                                Neuro Re-Ed                                                                                                        Ther Ex                                                                                                                     Ther Activity             education anatomy, POC x10'            Protective strategies pressure mgt, bowel mechanics x10'            Gait Training                                       Modalities

## 2022-08-22 NOTE — PROGRESS NOTES
PT Evaluation     Today's date: 2022  Patient name: Abner Mata  : 1975  MRN: [id number]  Referring provider: Ministerio Rhodes DO  Dx:   Encounter Diagnosis     ICD-10-CM    1  Weakening of rectovaginal tissue  N81 83    2  Urinary urgency  R39 15    3  Urge urinary incontinence  N39 41        Start Time: 1705  Stop Time: 1800  Total time in clinic (min): 55 minutes    Assessment  Assessment details: Abner Mata is a 50 y o  female who presents with concerns of  Urinary urgency, urge incontinence and mild pelvic organ prolapse  Assessment reveals pfm weakness with poor holding endurance, poor knowledge of protective strategies, and difficulty volitionally relaxing LA  Patient presents with the below outlined deficits and is appropriate for skilled physical therapy in order to address deficits and ultimately meet goal of independent self management of condition  Therapeutic activities performed upon examination included education regarding pelvic floor anatomy, explanation of exam technique, explanation of exam findings and discussion of treatment plan as well as expectations of the patient to emphasize the importance of compliance and adherence to physical therapy visits  Impairments: abnormal muscle tone, activity intolerance, impaired physical strength and lacks appropriate home exercise program    Goals  STGs to be met in 4 weeks:  * Patient will be compliant with introductory HEP as prescribed  * Patient presents with good understanding of pelvic floor protective strategies to reduce intra-abdominal pressure against pelvic organs  * Patient will demonstrate and report good postural techniques with toileting  LTGs to be met by discharge:  * Patient will present with a  50% improvement on FOTO score by discharge to indicate improved symptom management  * Patient reports that she is able to successfully suppress an urge to empty her bladder for 20' without leakage     * Normalize sEMG findings to indicate strength average > 12uV and resting average < 2 5uV  * Presents with improved nocturia to 1 toiletings/night for more thorough sleep  * Demonstrates correct isolation and relaxation of pelvic floor to palpation without overflow from global stabilizers  * Patient will use pelvic floor muscles correctly during functional ADLs such as coughing, sneezing, lifting and exercise activities to avoid excessive IAP and PFM strain  * Patient will be compliant with comprehensive home exercise program for self management of condition  Plan  Patient would benefit from: skilled physical therapy  Referral necessary: No  Planned modality interventions: biofeedback  Planned therapy interventions: manual therapy, neuromuscular re-education, patient education, strengthening, therapeutic activities, therapeutic exercise, home exercise program and breathing training  Frequency: 1x week  Duration in weeks: 12  Plan of Care beginning date: 8/22/2022  Plan of Care expiration date: 11/14/2022  Treatment plan discussed with: patient        PT Pelvic Floor Subjective:   History of Present Illness:   Patient reports that she has had "years" bladder urgency but in the past year this has worsened with urge leakage  She notes frequency has also increased and she has very little tolerance to coffee or alcohol with bladder urgency  ("I timed it and it's every 7 minutes if I drink coffee or alcohol ") She also feel a "bubble" rectally with flatulence       Mechanism of injury: childbirth          Recurrent probem    Quality of life: good    Social Support:     Relationship status: domestic partnership    Work status: employed full time ( at [employer])    Life stress severity: mild and moderate  Hand dominance:  Right  Diet and Exercise:    Diet:balanced nutrition    Walks 1 1/2 miles daily  Home exercises   OB/ gyn History    Gestational History:     Prior Pregnancy: Yes      Number of prior pregnancies: 1    Number of term pregnancies: 1    Delivery Type: vaginal delivery      Delivery Complications:  52/09/0440 - 8 lb 8oz, tearing with repair, forceps delivery     Menstrual History:      Menstrual irregularities irregular menses  hormone replacement therapy  Partial hysterectomy in 2014 - HPV and cervical scar tissue   Estradiol patches are changed twice weekly - prescribed in November 2021   Bladder Function:     Voiding Difficulties positive for: urgency, hesitancy and incomplete emptying      Voiding Difficulties comments:     Voiding frequency: every 1-2 hours and every 3-4 hours    Urinary leakage: urine leakage    Urinary leakage aggravated by: walking to the bathroom and 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    Nocturia (episodes per night): 1, 2, 3 and 4 (can fall back to sleep easily)    Painful urination: No      Intake (ounces): Water intake (oz): 120 ounces  Bowel Function:     Bowel Function comments:  Reports recent marked improvement in bowels  She has dealt with years of diarrhea and in the past 6 months she has increased water intake, has increased fruit and vegetable intake and has moved to an intermittent fasting eating pattern  BMs are now formed, pain-free and with less strain  She was educated to utilize a squatty potty and expressed understanding  Bowel frequency: every 2 days and daily  Sexual Function:     Sexually Active:  Sexually active    Pain during intercourse: No      Lubrication Use: No      pain does not cause abstinence  Pain:     No pain reported by patient    Diagnostic Tests:     None    Treatments:     None    Patient Goals:     Patient goals for therapy:  Relaxation, return to sport/leisure activities, improved comfort and improved bladder or bowel function    Other patient goals:  Decrease bladder frequency and urgency      Objective   Pelvic Floor Exam   Position: supine exam    Diastatis   Diastasis recti present? no    General Perineum Exam:   perineum intact  Positive for descent and no pelvic organ prolapse at rest      General perineum exam comments: No discharge, irritation, organ prolapse or skin breakdown evident  Sensation intact throughout vaginal introitus  Very mild posterior POP evident with bearing down  No prolapse evident at rest          Visual Inspection of Perineum:   Excursion of perineal body in cephalad direction with contraction of pelvic floor muscles (PFM): fair   Excursion of perineal body in caudal direction with relaxation of pelvic floor muscles (PFM): weak  Cotton swab test: non-tender  Cough reflex: cough reflex  Sphincter Tone Resting: normal  Sphincter Tone Squeeze: normal    Pelvic Organ Prolapse   Position: hook-lying  At rest: none  With bearing down: mild (>1cm from hymenal remnants)  Location: posterior    Pelvic Floor Muscle Exam     Palpation   No increased muscle tension in the pubococcygeus, iIliococcygeus and periurethral    Muscle Contraction: overflow  Breathing pattern with contraction: within normal limits  Pelvic floor muscle relaxation is incomplete     40% pelvic floor relaxation    PERFECT Score   Power right: 2+/5  Power left: 2+/5  Endurance (seconds to max): 4  Repetitions (before fatigue): 4    SMEG Biofeedback   to be assessed next treatment        Flowsheet Rows    Flowsheet Row Most Recent Value   PT/OT G-Codes    Current Score 13   Projected Score 0             Precautions: standard      Manuals 8/22                                                                Neuro Re-Ed                                                                                                        Ther Ex                                                                                                                     Ther Activity             education anatomy, POC x10'            Protective strategies pressure mgt, bowel mechanics x10'            Gait Training                                       Modalities

## 2022-08-29 ENCOUNTER — OFFICE VISIT (OUTPATIENT)
Dept: PHYSICAL THERAPY | Facility: REHABILITATION | Age: 47
End: 2022-08-29
Payer: COMMERCIAL

## 2022-08-29 DIAGNOSIS — N81.83 WEAKENING OF RECTOVAGINAL TISSUE: Primary | ICD-10-CM

## 2022-08-29 DIAGNOSIS — N39.41 URGE URINARY INCONTINENCE: ICD-10-CM

## 2022-08-29 DIAGNOSIS — R39.15 URINARY URGENCY: ICD-10-CM

## 2022-08-29 PROCEDURE — 97112 NEUROMUSCULAR REEDUCATION: CPT

## 2022-08-29 PROCEDURE — 97110 THERAPEUTIC EXERCISES: CPT

## 2022-08-29 PROCEDURE — 97530 THERAPEUTIC ACTIVITIES: CPT

## 2022-08-29 NOTE — PROGRESS NOTES
Daily Note     Today's date: 2022  Patient name: Byron Nascimento  : 1975  MRN: [id number]  Referring provider: Paradise Morton DO  Dx:   Encounter Diagnosis     ICD-10-CM    1  Weakening of rectovaginal tissue  N81 83    2  Urinary urgency  R39 15    3  Urge urinary incontinence  N39 41        Start Time: 1719  Stop Time: 1800  Total time in clinic (min): 41 minutes   Patient reports that she has had "years" bladder urgency but in the past year this has worsened with urge leakage  She notes frequency has also increased and she has very little tolerance to coffee or alcohol with bladder urgency  ("I timed it and it's every 7 minutes if I drink coffee or alcohol ") She also feel a "bubble" rectally with flatulence         Subjective: Pt denies any changes since IE  "Tired of waking up at night"  Objective: See treatment diary below    Surface EMG biofeedback was used to augment rest tone and was performed in hooklying and sitting position  Patient presents with 4 0 uV resting activity level at baseline   Set#1: 5 sec active PFM contractions followed by 10 sec of rest for 10 repetitions  Average muscle activity during work phase measured 15 4uV, with the goal being > 12 0uV; average muscle activity during rest phase measured 2 9 uV with the goal being < 2 5uV   Set#2: Performed 10 sec active PFM contractions followed by 4 sec of rest for 5 repetitions  Average muscle activity during work phase measured 15 5uV, with the goal being > 12 0uV; average muscle activity during rest phase measured 2  7uV with the goal being < 2 5uV   Set#3: Performed sitting 5 sec active PFM contractions followed by 10 sec of rest for 10 repetitions  Average muscle activity during work phase measured 5 6uV, with the goal being > 12 0uV; average muscle activity during rest phase measured 0  3uV with the goal being < 2 5uV  Assessment: Tolerated treatment well   Patient would benefit from continued PT  SEMG initially revealed elevated resting tone and delayed rest after contraction but was able to improve  Educated in how tension in her pelvic floor muscles can aggravate her symptoms  Also reviewed urge deferral strategy to implement during the day and night  Goals  STGs to be met in 4 weeks:  * Patient will be compliant with introductory HEP as prescribed  * Patient presents with good understanding of pelvic floor protective strategies to reduce intra-abdominal pressure against pelvic organs  * Patient will demonstrate and report good postural techniques with toileting  LTGs to be met by discharge:  * Patient will present with a  50% improvement on FOTO score by discharge to indicate improved symptom management  * Patient reports that she is able to successfully suppress an urge to empty her bladder for 20' without leakage  * Normalize sEMG findings to indicate strength average > 12uV and resting average < 2 5uV  * Presents with improved nocturia to 1 toiletings/night for more thorough sleep  * Demonstrates correct isolation and relaxation of pelvic floor to palpation without overflow from global stabilizers  * Patient will use pelvic floor muscles correctly during functional ADLs such as coughing, sneezing, lifting and exercise activities to avoid excessive IAP and PFM strain  * Patient will be compliant with comprehensive home exercise program for self management of condition  Plan: Continue per plan of care        Precautions: standard      Manuals 8/22 8/29                                                               Neuro Re-Ed             Biofeedback  23'           DB                                                                              Ther Ex             Nustep  L4 8'                                                                                                      Ther Activity             education anatomy, POC x10'            Protective strategies pressure mgt, bowel mechanics x10' Urge defferal            Gait Training                                       Modalities

## 2022-09-07 ENCOUNTER — TELEPHONE (OUTPATIENT)
Dept: ADMINISTRATIVE | Facility: OTHER | Age: 47
End: 2022-09-07

## 2022-09-07 ENCOUNTER — OFFICE VISIT (OUTPATIENT)
Dept: FAMILY MEDICINE CLINIC | Facility: CLINIC | Age: 47
End: 2022-09-07
Payer: COMMERCIAL

## 2022-09-07 VITALS
SYSTOLIC BLOOD PRESSURE: 112 MMHG | HEART RATE: 80 BPM | WEIGHT: 175 LBS | BODY MASS INDEX: 27.47 KG/M2 | DIASTOLIC BLOOD PRESSURE: 68 MMHG | OXYGEN SATURATION: 98 % | HEIGHT: 67 IN

## 2022-09-07 DIAGNOSIS — Z12.31 ENCOUNTER FOR SCREENING MAMMOGRAM FOR BREAST CANCER: Primary | ICD-10-CM

## 2022-09-07 DIAGNOSIS — E05.90 HYPERTHYROIDISM: ICD-10-CM

## 2022-09-07 DIAGNOSIS — M77.50 TENDONITIS OF ANKLE OR FOOT: ICD-10-CM

## 2022-09-07 DIAGNOSIS — K58.9 IRRITABLE BOWEL SYNDROME, UNSPECIFIED TYPE: ICD-10-CM

## 2022-09-07 DIAGNOSIS — Z13.220 LIPID SCREENING: ICD-10-CM

## 2022-09-07 DIAGNOSIS — R00.2 PALPITATIONS: ICD-10-CM

## 2022-09-07 DIAGNOSIS — N39.41 URGE INCONTINENCE OF URINE: ICD-10-CM

## 2022-09-07 DIAGNOSIS — C44.92 SQUAMOUS CELL CARCINOMA OF SKIN: ICD-10-CM

## 2022-09-07 DIAGNOSIS — E55.9 VITAMIN D DEFICIENCY: ICD-10-CM

## 2022-09-07 DIAGNOSIS — F17.200 TOBACCO USE DISORDER: ICD-10-CM

## 2022-09-07 DIAGNOSIS — R53.83 FATIGUE, UNSPECIFIED TYPE: ICD-10-CM

## 2022-09-07 PROBLEM — R32 URINARY INCONTINENCE: Status: ACTIVE | Noted: 2022-09-07

## 2022-09-07 PROCEDURE — 99396 PREV VISIT EST AGE 40-64: CPT | Performed by: FAMILY MEDICINE

## 2022-09-07 PROCEDURE — 3725F SCREEN DEPRESSION PERFORMED: CPT | Performed by: FAMILY MEDICINE

## 2022-09-07 PROCEDURE — 99214 OFFICE O/P EST MOD 30 MIN: CPT | Performed by: FAMILY MEDICINE

## 2022-09-07 RX ORDER — CELECOXIB 200 MG/1
200 CAPSULE ORAL DAILY
Qty: 30 CAPSULE | Refills: 0 | Status: SHIPPED | OUTPATIENT
Start: 2022-09-07 | End: 2022-10-06 | Stop reason: SDUPTHER

## 2022-09-07 RX ORDER — MELATONIN
1000 DAILY
Start: 2022-09-07

## 2022-09-07 RX ORDER — ESTRADIOL 0.05 MG/D
PATCH, EXTENDED RELEASE TRANSDERMAL
COMMUNITY
Start: 2022-08-15

## 2022-09-07 NOTE — ASSESSMENT & PLAN NOTE
Recommend that she continue follow-up with PT, as well as her gyn  No changes at the moment  She did note that the patches made a minor amount of changes to her symptoms

## 2022-09-07 NOTE — ASSESSMENT & PLAN NOTE
Vitamin-D was only minimally low  I would recommend starting supplementation, as well as recheck in 3 months  This may be partially responsible for her fatigue

## 2022-09-07 NOTE — PATIENT INSTRUCTIONS
Problem List Items Addressed This Visit       Fatigue     Patient has a significant amount of fatigue throughout the day  She does have unrefreshing sleep and daytime sleepiness, but I am wondering if this is related to her frequent urination at night and the poor sleep that she gets  Will look to see if that improves with the physical therapy  If she is having less times where she has to get up and urinate, should be somewhat better with that  As she was discussing the possibility of B12 deficiency, will check a B12 level, but her last CBC was normal and she has not had intestinal surgery prior  Relevant Orders    Vitamin B12    Hyperthyroidism     TSH was normal   Will follow in the future  Irritable bowel syndrome     Patient reports that with changes in her diet, i e  Increased fiber and increased fluids, mostly water, she is much better with this, and not really having any particular problems  Follow-up only as needed  Palpitations     Patient following with Cardiology  [facility]  Will wait to see what their letter has as far as follow-up and recommendations  I would certainly caution has about stimulants, such as Ginkoba lobe and Ashwaganda  Relevant Orders    Comprehensive metabolic panel    Squamous cell carcinoma of skin     Continue yearly follow-up with Dermatology or more often as appropriate per their recommendations  Tendonitis of ankle or foot     Patient does have what appears to be a very good description of plantar fasciitis on the left foot  Recommend quarter-inch felt heel lift  Certainly, would recommend sneakers with adequate support and cushioning  Trial nonsteroidal anti-inflammatories, Celebrex 200 mg a day  Could consider follow-up with podiatry, as well as injections, and consider physical therapy             Relevant Medications    celecoxib (CeleBREX) 200 mg capsule    Tobacco use disorder     Patient reports she quit smoking cigarettes, but does still vape on occasion  She is commended for quitting smoking, but I would caution her about vapes, which she does understand already  Urinary incontinence     Recommend that she continue follow-up with PT, as well as her gyn  No changes at the moment  She did note that the patches made a minor amount of changes to her symptoms  Vitamin D deficiency     Vitamin-D was only minimally low  I would recommend starting supplementation, as well as recheck in 3 months  This may be partially responsible for her fatigue  Relevant Medications    cholecalciferol (VITAMIN D3) 1,000 units tablet    Other Relevant Orders    Vitamin D 25 hydroxy          Other Visit Diagnoses       Encounter for screening mammogram for breast cancer    -  Primary    Relevant Orders    Mammo screening bilateral w 3d & cad    Lipid screening        Relevant Orders    Comprehensive metabolic panel    Lipid panel            COVID 19 Instructions    Genelogan Conchita was advised to limit contact with others to essential tasks such as getting food, medications, and medical care  Proper handwashing reviewed, and Hand sanitzer when washing is not available  If the patient develops symptoms of COVID 19, the patient should call the office as soon as possible  For [id number] Flu season, it is strongly recommended that Flu Vaccinations be obtained  Virtual Visits:  Asiya: This works on smart phones (any phone with Internet browsing capability)  You should get a text message when the provider is ready to see you  Click on the link in the text message, and the call should start  You will need to type in your name, and allow camera and microphone access  This is HIPPA compliant, and secure  If you have not already done so, get immunized to COVID 19        We are committed to getting you vaccinated as soon as possible and will be closely following CDC and [facility]  guidelines as they are released and revised  Please refer to our COVID-19 vaccine webpage for the most up to date information on the vaccine and our distribution efforts  This site will also have the most up to date recommendations for COVID booster vaccine  Jeffrey schwab    Call [phone] ([phone]), option 7    OUR NEW LOCATION:    [address], [facility] [address], [place], [address]  Fax: [phone]    Lab services and OB/GYN are at this location as well

## 2022-09-07 NOTE — ASSESSMENT & PLAN NOTE
Patient reports she quit smoking cigarettes, but does still vape on occasion  She is commended for quitting smoking, but I would caution her about vapes, which she does understand already

## 2022-09-07 NOTE — ASSESSMENT & PLAN NOTE
Patient has a significant amount of fatigue throughout the day  She does have unrefreshing sleep and daytime sleepiness, but I am wondering if this is related to her frequent urination at night and the poor sleep that she gets  Will look to see if that improves with the physical therapy  If she is having less times where she has to get up and urinate, should be somewhat better with that  As she was discussing the possibility of B12 deficiency, will check a B12 level, but her last CBC was normal and she has not had intestinal surgery prior

## 2022-09-07 NOTE — ASSESSMENT & PLAN NOTE
Patient does have what appears to be a very good description of plantar fasciitis on the left foot  Recommend quarter-inch felt heel lift  Certainly, would recommend sneakers with adequate support and cushioning  Trial nonsteroidal anti-inflammatories, Celebrex 200 mg a day  Could consider follow-up with podiatry, as well as injections, and consider physical therapy

## 2022-09-07 NOTE — ASSESSMENT & PLAN NOTE
Patient following with Cardiology  [facility] is [facility]  Will wait to see what their letter has as far as follow-up and recommendations  I would certainly caution has about stimulants, such as Ginkoba lobe and Brynn

## 2022-09-07 NOTE — TELEPHONE ENCOUNTER
----- Message from Leland Rolon sent at 9/7/2022 10:48 AM EDT -----  Regarding: Care Gap Request  09/07/22 10:48 AM    Hello, our patient attached above has had Pap Smear (HPV) aka Cervical Cancer Screening completed/performed  Please assist in updating the patient chart by making an External outreach to Dr Toby Liang facility located in [place]  The date of service is in the last 6 months per pt      Phone# MLVYT: [phone]     Thank you,  [facility]

## 2022-09-07 NOTE — TELEPHONE ENCOUNTER
Upon review of the In Basket request and the patient's chart, initial outreach has been made via fax, please see Contacts section for details       Thank you  Prasanth Zavala MA

## 2022-09-07 NOTE — LETTER
Procedure Request Form: Cervical Cancer Screening      Date Requested: 22  Patient: Rosa Nunez  Patient : 1975   Referring Provider: Van Sanchez, PA-C        Date of Procedure ______________________________       The above patient has informed us that they have completed their   most recent Cervical Cancer Screening at your facility  Please complete   this form and attach all corresponding procedure reports/results  Comments __________________________________________________________  ____________________________________________________________________  ____________________________________________________________________  ____________________________________________________________________    Facility Completing Procedure _________________________________________    Form Completed By (print name) _______________________________________      Signature __________________________________________________________      These reports are needed for  compliance  Please fax this completed form and a copy of the procedure report to our office located at [place] [address] as soon as possible to [phone] sagrario Young: Phone [phone]    We thank you for your assistance in treating our mutual patient

## 2022-09-07 NOTE — PROGRESS NOTES
Daily Note     Today's date: 2022  Patient name: Michael Zendejas  : 1975  MRN: [id number]  Referring provider: Aishwarya Ontiveros DO  Dx:   Encounter Diagnosis     ICD-10-CM    1  Weakening of rectovaginal tissue  N81 83    2  Urinary urgency  R39 15    3  Urge urinary incontinence  N39 41                  Patient reports that she has had "years" bladder urgency but in the past year this has worsened with urge leakage  She notes frequency has also increased and she has very little tolerance to coffee or alcohol with bladder urgency  ("I timed it and it's every 7 minutes if I drink coffee or alcohol ") She also feel a "bubble" rectally with flatulence         Subjective: Pt reports that her bladder urgency symptoms are about the same  Objective: See treatment diary below    Surface EMG biofeedback was used to augment rest tone and was performed in hooklying and sitting position  Patient presents with 4 0 uV resting activity level at baseline   Set#1: 5 sec active PFM contractions followed by 10 sec of rest for 10 repetitions  Average muscle activity during work phase measured 15 4uV, with the goal being > 12 0uV; average muscle activity during rest phase measured 2 9 uV with the goal being < 2 5uV   Set#2: Performed 10 sec active PFM contractions followed by 4 sec of rest for 5 repetitions  Average muscle activity during work phase measured 15 5uV, with the goal being > 12 0uV; average muscle activity during rest phase measured 2  7uV with the goal being < 2 5uV   Set#3: Performed sitting 5 sec active PFM contractions followed by 10 sec of rest for 10 repetitions  Average muscle activity during work phase measured 5 6uV, with the goal being > 12 0uV; average muscle activity during rest phase measured 0  3uV with the goal being < 2 5uV  Assessment: Tolerated treatment well  Patient would benefit from continued PT   MT this session with focus on relaxation and education in avoiding going to the BR during the bell curve peak as well as "check ins" every hour at work to allow her to relax her pelvic floor and do some deep breathing  NV - add PFM co-contractions  Goals  STGs to be met in 4 weeks:  * Patient will be compliant with introductory HEP as prescribed  * Patient presents with good understanding of pelvic floor protective strategies to reduce intra-abdominal pressure against pelvic organs  * Patient will demonstrate and report good postural techniques with toileting  LTGs to be met by discharge:  * Patient will present with a  50% improvement on FOTO score by discharge to indicate improved symptom management  * Patient reports that she is able to successfully suppress an urge to empty her bladder for 20' without leakage  * Normalize sEMG findings to indicate strength average > 12uV and resting average < 2 5uV  * Presents with improved nocturia to 1 toiletings/night for more thorough sleep  * Demonstrates correct isolation and relaxation of pelvic floor to palpation without overflow from global stabilizers  * Patient will use pelvic floor muscles correctly during functional ADLs such as coughing, sneezing, lifting and exercise activities to avoid excessive IAP and PFM strain  * Patient will be compliant with comprehensive home exercise program for self management of condition  Plan: Continue per plan of care        Precautions: standard      Manuals 8/22 8/29 9/8          PFM cuing   10'          PFM release   10'          C/R followed by stretching   15'                       Neuro Re-Ed             Biofeedback  23'           DB                                                                              Ther Ex             Nustep  L4 8' 8'                                                                                                     Ther Activity             education anatomy, POC x10'            Protective strategies pressure mgt, bowel mechanics x10' Urge defferal Urge suppression techniquex 10'          Gait Training                                       Modalities

## 2022-09-07 NOTE — PROGRESS NOTES
[facility] PRIMARY CARE    NAME: Margareth Dumont  AGE: 50 y o  SEX: female  : 1975     DATE: 2022     Assessment and Plan:     Problem List Items Addressed This Visit     Fatigue     Patient has a significant amount of fatigue throughout the day  She does have unrefreshing sleep and daytime sleepiness, but I am wondering if this is related to her frequent urination at night and the poor sleep that she gets  Will look to see if that improves with the physical therapy  If she is having less times where she has to get up and urinate, should be somewhat better with that  As she was discussing the possibility of B12 deficiency, will check a B12 level, but her last CBC was normal and she has not had intestinal surgery prior  Relevant Orders    Vitamin B12    Hyperthyroidism     TSH was normal   Will follow in the future  Irritable bowel syndrome     Patient reports that with changes in her diet, i e  Increased fiber and increased fluids, mostly water, she is much better with this, and not really having any particular problems  Follow-up only as needed  Palpitations     Patient following with Cardiology  [facility]  Will wait to see what their letter has as far as follow-up and recommendations  I would certainly caution has about stimulants, such as Ginkoba lobe and Ashwaganda  Relevant Orders    Comprehensive metabolic panel    Squamous cell carcinoma of skin     Continue yearly follow-up with Dermatology or more often as appropriate per their recommendations  Tendonitis of ankle or foot     Patient does have what appears to be a very good description of plantar fasciitis on the left foot  Recommend quarter-inch felt heel lift  Certainly, would recommend sneakers with adequate support and cushioning  Trial nonsteroidal anti-inflammatories, Celebrex 200 mg a day    Could consider follow-up with podiatry, as well as injections, and consider physical therapy  Relevant Medications    celecoxib (CeleBREX) 200 mg capsule    Tobacco use disorder     Patient reports she quit smoking cigarettes, but does still vape on occasion  She is commended for quitting smoking, but I would caution her about vapes, which she does understand already  Urinary incontinence     Recommend that she continue follow-up with PT, as well as her gyn  No changes at the moment  She did note that the patches made a minor amount of changes to her symptoms  Vitamin D deficiency     Vitamin-D was only minimally low  I would recommend starting supplementation, as well as recheck in 3 months  This may be partially responsible for her fatigue  Relevant Medications    cholecalciferol (VITAMIN D3) 1,000 units tablet    Other Relevant Orders    Vitamin D 25 hydroxy      Other Visit Diagnoses     Encounter for screening mammogram for breast cancer    -  Primary    Relevant Orders    Mammo screening bilateral w 3d & cad    Lipid screening        Relevant Orders    Comprehensive metabolic panel    Lipid panel          Immunizations and preventive care screenings were discussed with patient today  Appropriate education was printed on patient's after visit summary  Counseling:  Alcohol/drug use: discussed moderation in alcohol intake, the recommendations for healthy alcohol use, and avoidance of illicit drug use  Dental Health: discussed importance of regular tooth brushing, flossing, and dental visits  Injury prevention: discussed safety/seat belts, safety helmets, smoke detectors, carbon dioxide detectors, and smoking near bedding or upholstery  Sexual health: discussed sexually transmitted diseases, partner selection, use of condoms, avoidance of unintended pregnancy, and contraceptive alternatives  Exercise: the importance of regular exercise/physical activity was discussed  Recommend exercise 3-5 times per week for at least 30 minutes  BMI Counseling: Body mass index is 27 61 kg/m²  The BMI is above normal  Nutrition recommendations include decreasing portion sizes, encouraging healthy choices of fruits and vegetables, decreasing fast food intake, consuming healthier snacks, limiting drinks that contain sugar, moderation in carbohydrate intake, increasing intake of lean protein, reducing intake of saturated and trans fat and reducing intake of cholesterol  Exercise recommendations include exercising 3-5 times per week  No pharmacotherapy was ordered  Rationale for BMI follow-up plan is due to patient being overweight or obese  Depression Screening and Follow-up Plan: Patient was screened for depression during today's encounter  They screened negative with a PHQ-2 score of 0  Return in about 3 months (around 12/7/2022)  Chief Complaint:     Chief Complaint   Patient presents with    Physical Exam     Pt states she has not had PE in along time and was recently told by her OBGYN her Thyroid hormones  She brought along her BW results  Also pt has Plantar Fascitis and would like to discuss this and also discuss B12 injections due to her recent menopausal state  kw      History of Present Illness:     Patient is here to follow-up on several issues  She did have blood work done by Jennifer  Reviewed copy that she provided today  White count 7 0, hemoglobin 13 8, hematocrit 40 9, platelets 306  Vitamin-D 30  TSH 0 28  Free thyroxine 1 2  Labs were from [facility]   Patient did provide a copy, and we will scan that into the chart today  Of note, the patient has been having significant amount of fatigue, and wondered about B12 injections for that  We also discussed about thyroid, and her TSH  Further discuss about vitamin-D  She is also currently using ashwagonda, and gingko biloba    She reports that both of the seem to improve her mood and activity level     Menopause:  Patient is following with gyn  She is currently using patches for hormone replacement  She noted that she does have fatigue since starting menopause, and also has some muscle aches that goes along with it  Patient started with hormone replacement in February  She has felt significantly better since using the patches, mostly the vasomotor symptoms of menopause  Vitamin-D level was slightly low  This was not something that she was aware of prior  Patient also was concerned about plantar fasciitis  Noted that on her left foot she has discomfort  Patient notes that she has significant discomfort all the time, and some swelling at times with this  When she wakes up in the morning, she has still some shooting pains in the foot  When she stands on her foot, the pain increases significantly  Walking on her toes makes it somewhat better, but certainly not gone  Resting for a bit at work and then restarting walking increases the discomfort  She did note that with walking for a while it can improve a little bit, but usually makes it worse  Last week, she was helping someone move, and noted significant discomfort the next day  She does noted in both the initially, but now it is just the left that bothers her the most   Patient has tried stretching, gentle massage  No real change with any treatment so far  Adult Annual Physical   Patient here for a comprehensive physical exam  The patient reports problems - Per HPI  Diet and Physical Activity  Diet/Nutrition: well balanced diet, limited junk food, low fat diet, low carb diet, consuming 3-5 servings of fruits/vegetables daily, adequate fiber intake, adequate whole grain intake and Patient is participating in intermittent fasting      Exercise: walking, 5-7 times a week on average and 30-60 minutes on average        Depression Screening  PHQ-2/9 Depression Screening    Little interest or pleasure in doing things: 0 - not at all  Feeling down, depressed, or hopeless: 0 - not at all  PHQ-2 Score: 0  PHQ-2 Interpretation: Negative depression screen       General Health  Sleep: sleeps poorly and Wakes up feeling unrefreshed, has been getting up multiple times through the night to urinate  Fabricio Andrews Hearing: decreased - bilateral   Vision: goes for regular eye exams and wears glasses  Dental: regular dental visits  /GYN Health  Patient is: postmenopausal  Last menstrual period:   Contraceptive method: In menopause, using estrogen patch  Review of Systems:     Review of Systems   Constitutional: Positive for fatigue  HENT: Negative  Eyes: Negative  Respiratory: Negative  Cardiovascular: Negative  Gastrointestinal: Negative  Endocrine: Negative  Genitourinary: Negative  Musculoskeletal: Positive for myalgias  Skin: Negative  Allergic/Immunologic: Negative  Neurological: Negative  Hematological: Negative  Psychiatric/Behavioral: Negative         Past Medical History:     Past Medical History:   Diagnosis Date    Anxiety     LAST ASSESSED: [id number]    Bleeding hemorrhoids     LAST ASSESSED: [id number]    Chronic pharyngitis     LAST ASSESSED: [id number]    Conjunctival hemorrhage     LAST ASSESSED: [id number]    Eczema     LAST ASSESSED: [id number]    Irritable bowel     Irritable bowel syndrome     LAST ASSESSED: [id number]    Palpitations     Polyp of sigmoid colon     LAST ASSESSED: [id number]    Syncope     LAST ASSESSED: [id number]      Past Surgical History:     Past Surgical History:   Procedure Laterality Date    COMBINED AUGMENTATION MAMMAPLASTY AND ABDOMINOPLASTY      HYSTERECTOMY  08/28/2014      Social History:     Social History     Socioeconomic History    Marital status:      Spouse name: None    Number of children: None    Years of education: None    Highest education level: None   Occupational History    Occupation: OFFICE WORK   Tobacco Use    Smoking status: Former Smoker     Packs/day: 0 50     Quit date: 2016     Years since quittin 6    Smokeless tobacco: Never Used   Vaping Use    Vaping Use: Every day    Substances: Nicotine, Flavoring   Substance and Sexual Activity    Alcohol use: Yes     Comment: socially    Drug use: No    Sexual activity: Yes     Partners: Male     Birth control/protection: Female Sterilization, Post-menopausal   Other Topics Concern    None   Social History Narrative    None     Social Determinants of Health     Financial Resource Strain: Not on file   Food Insecurity: Not on file   Transportation Needs: Not on file   Physical Activity: Not on file   Stress: Not on file   Social Connections: Not on file   Intimate Partner Violence: Not on file   Housing Stability: Not on file      Family History:     Family History   Problem Relation Age of Onset    No Known Problems Mother     Lumbar disc disease Father         Status post fusion    Alcohol abuse Sister     Diabetes Family       Current Medications:     Current Outpatient Medications   Medication Sig Dispense Refill    ASHWAGANDHA PO Take by mouth      celecoxib (CeleBREX) 200 mg capsule Take 1 capsule (200 mg total) by mouth daily 30 capsule 0    cholecalciferol (VITAMIN D3) 1,000 units tablet Take 1 tablet (1,000 Units total) by mouth daily      Ginkgo Biloba 100 MG CAPS Take by mouth      MAGNESIUM CITRATE PO Take by mouth      Misc Natural Products (FIBER 7 PO) Take by mouth      Probiotic Product (PROBIOTIC-10 PO) Take by mouth      SELENIUM PO Take by mouth      Zinc Sulfate (ZINC 15 PO) Take by mouth      Carole 0 05 MG/24HR       hydrOXYzine HCL (ATARAX) 25 mg tablet Take 1 tablet (25 mg total) by mouth every 6 (six) hours as needed for itching (Patient not taking: Reported on 2022) 30 tablet 0     Current Facility-Administered Medications   Medication Dose Route Frequency Provider Last Rate Last Admin    levalbuterol (XOPENEX) inhalation solution 1 25 mg  1 25 mg Nebulization Q8H PRN Will Caban MD   1 25 mg at 02/03/18 0909      Allergies:      Allergies   Allergen Reactions    Rabeprazole Shortness Of Breath and Tachycardia     Other reaction(s): Hypotension, Tremor, Vomiting    Chantix  [Varenicline] Nausea Only     Other reaction(s): Anger    Nsaids      Annotation - [id number]: Avoid due to gastritis, 2004    Penicillins Hives      Physical Exam:     /68 (BP Location: Left arm, Patient Position: Sitting)   Pulse 80   Ht 5' 6 75" (1 695 m)   Wt 79 4 kg (175 lb)   SpO2 98%   BMI 27 61 kg/m²     Physical Exam     Espinoza Eubanks MD  [address] [address]

## 2022-09-07 NOTE — ASSESSMENT & PLAN NOTE
Patient reports that with changes in her diet, i e  Increased fiber and increased fluids, mostly water, she is much better with this, and not really having any particular problems  Follow-up only as needed

## 2022-09-08 ENCOUNTER — OFFICE VISIT (OUTPATIENT)
Dept: PHYSICAL THERAPY | Facility: REHABILITATION | Age: 47
End: 2022-09-08
Payer: COMMERCIAL

## 2022-09-08 DIAGNOSIS — N39.41 URGE URINARY INCONTINENCE: ICD-10-CM

## 2022-09-08 DIAGNOSIS — N81.83 WEAKENING OF RECTOVAGINAL TISSUE: Primary | ICD-10-CM

## 2022-09-08 DIAGNOSIS — R39.15 URINARY URGENCY: ICD-10-CM

## 2022-09-08 PROCEDURE — 97140 MANUAL THERAPY 1/> REGIONS: CPT | Performed by: PHYSICAL THERAPIST

## 2022-09-08 PROCEDURE — 97110 THERAPEUTIC EXERCISES: CPT | Performed by: PHYSICAL THERAPIST

## 2022-09-08 PROCEDURE — 97530 THERAPEUTIC ACTIVITIES: CPT | Performed by: PHYSICAL THERAPIST

## 2022-09-09 NOTE — TELEPHONE ENCOUNTER
Upon review of the In Basket request we received the LOV from OBGYN patient had a hysterectomy in 2014 and last pap was 2014  Office level will have to add exclusion to HM     Any additional questions or concerns should be emailed to the Practice Liaisons via [email or web address] email, please do not reply via In Basket      Thank you  Luvenia Severance, MA

## 2022-09-15 ENCOUNTER — OFFICE VISIT (OUTPATIENT)
Dept: PHYSICAL THERAPY | Facility: REHABILITATION | Age: 47
End: 2022-09-15
Payer: COMMERCIAL

## 2022-09-15 DIAGNOSIS — R39.15 URINARY URGENCY: ICD-10-CM

## 2022-09-15 DIAGNOSIS — N81.83 WEAKENING OF RECTOVAGINAL TISSUE: Primary | ICD-10-CM

## 2022-09-15 DIAGNOSIS — N39.41 URGE URINARY INCONTINENCE: ICD-10-CM

## 2022-09-15 PROCEDURE — 97112 NEUROMUSCULAR REEDUCATION: CPT

## 2022-09-15 PROCEDURE — 97110 THERAPEUTIC EXERCISES: CPT

## 2022-09-15 NOTE — PROGRESS NOTES
Daily Note     Today's date: 9/15/2022  Patient name: Dano Carpio  : 1975  MRN: [id number]  Referring provider: Charly Paredes DO  Dx:   Encounter Diagnosis     ICD-10-CM    1  Weakening of rectovaginal tissue  N81 83    2  Urinary urgency  R39 15    3  Urge urinary incontinence  N39 41        Start Time: 1700  Stop Time: 1746  Total time in clinic (min): 46 minutes   Patient reports that she has had "years" bladder urgency but in the past year this has worsened with urge leakage  She notes frequency has also increased and she has very little tolerance to coffee or alcohol with bladder urgency  ("I timed it and it's every 7 minutes if I drink coffee or alcohol ") She also feel a "bubble" rectally with flatulence         Subjective: Pt denies any major changes, is using the strategies  Later in the session reported having less urgency when consuming alcohol this week end  Objective: See treatment diary below    Access Code: [email or web address]  URL: [email or web address]  Date: 09/15/2022  Prepared by: Vik Armenta    Program Notes - ENCOURAGED TO PERFORM 15 minutes a day  On the exhale, engage your pelvic floor muscles and lower abdominals as you zipper up   Do not hold your breath and perform slowly :)    Exercises  · Supine Bridge with Pelvic Floor Contraction - 1 x daily - 7 x weekly - 2 sets - 10 reps  · Sit to Stand with Pelvic Floor Contraction - 1 x daily - 7 x weekly - 2 sets - 10 reps  · Seated Pelvic Floor Contraction with Isometric Hip Adduction - 1 x daily - 7 x weekly - 2 sets - 10 reps  · Seated Pelvic Floor Contraction with Hip Abduction and Resistance Loop - 1 x daily - 7 x weekly - 2 sets - 10 reps  · Sidelying Pelvic Floor Contraction with Hip Abduction - 1 x daily - 7 x weekly - 2 sets - 10 reps  · Quadruped Exhale with Pelvic Floor Contraction - 1 x daily - 7 x weekly - 2 sets - 10 reps  · Hip Hinge Rock Back - 1 x daily - 7 x weekly - 2 sets - 10 reps    Assessment: Tolerated treatment well  Patient would benefit from continued PT  Spent today's session on adding co-contraction in different positions  Pt demonstrates the best awareness in quadriped position  Benefits from cueing to improve pacing, tendency to perform quickly  Goals  STGs to be met in 4 weeks:  * Patient will be compliant with introductory HEP as prescribed  * Patient presents with good understanding of pelvic floor protective strategies to reduce intra-abdominal pressure against pelvic organs  * Patient will demonstrate and report good postural techniques with toileting  LTGs to be met by discharge:  * Patient will present with a  50% improvement on FOTO score by discharge to indicate improved symptom management  * Patient reports that she is able to successfully suppress an urge to empty her bladder for 20' without leakage  * Normalize sEMG findings to indicate strength average > 12uV and resting average < 2 5uV  * Presents with improved nocturia to 1 toiletings/night for more thorough sleep  * Demonstrates correct isolation and relaxation of pelvic floor to palpation without overflow from global stabilizers  * Patient will use pelvic floor muscles correctly during functional ADLs such as coughing, sneezing, lifting and exercise activities to avoid excessive IAP and PFM strain  * Patient will be compliant with comprehensive home exercise program for self management of condition  Plan: Continue per plan of care        Precautions: standard      Manuals 8/22 8/29 9/8 9/15         PFM cuing   10' -         PFM release   10'          C/R followed by stretching   15'                       Neuro Re-Ed             Biofeedback  23'           DB             Hip abd    1'         Hip add    1'         Quadriped PFM    1'         Quadriped hip hinge    1'         Quadriped w/ foam and band     1'         Ther Ex             Nustep  L4 8' 8' L4 8'         bridge    1'         SLR    1' S/l hip abduction    1'         clamshells    1'         Standing rows    YCO 2x10         Standing shoulder ext    YCO 2x10                      Ther Activity             education anatomy, POC x10'            Protective strategies pressure mgt, bowel mechanics x10' Urge defferal  Urge suppression techniquex 10'          Gait Training                                       Modalities

## 2022-09-22 ENCOUNTER — OFFICE VISIT (OUTPATIENT)
Dept: PHYSICAL THERAPY | Facility: REHABILITATION | Age: 47
End: 2022-09-22
Payer: COMMERCIAL

## 2022-09-22 DIAGNOSIS — R39.15 URINARY URGENCY: ICD-10-CM

## 2022-09-22 DIAGNOSIS — N39.41 URGE URINARY INCONTINENCE: ICD-10-CM

## 2022-09-22 DIAGNOSIS — N81.83 WEAKENING OF RECTOVAGINAL TISSUE: Primary | ICD-10-CM

## 2022-09-22 PROCEDURE — 97110 THERAPEUTIC EXERCISES: CPT | Performed by: PHYSICAL THERAPIST

## 2022-09-22 PROCEDURE — 97112 NEUROMUSCULAR REEDUCATION: CPT | Performed by: PHYSICAL THERAPIST

## 2022-09-22 NOTE — PROGRESS NOTES
Daily Note/ Discharge     Today's date: 2022  Patient name: Bhavya Adkins  : 1975  MRN: [id number]  Referring provider: Gian Reyna DO  Dx:   Encounter Diagnosis     ICD-10-CM    1  Weakening of rectovaginal tissue  N81 83    2  Urinary urgency  R39 15    3  Urge urinary incontinence  N39 41        Start Time: 1650         Patient reports that she has had "years" bladder urgency but in the past year this has worsened with urge leakage  She notes frequency has also increased and she has very little tolerance to coffee or alcohol with bladder urgency  ("I timed it and it's every 7 minutes if I drink coffee or alcohol ") She also feel a "bubble" rectally with flatulence         Subjective: Pt reports that she feels she is ready to be discharged  She feels improved with bladder urgency during the day but still with some symptoms at night time  She has been working on urge suppression strategies  Objective: See treatment diary below    Access Code: [email or web address]  URL: [email or web address]  Date: 09/15/2022  Prepared by: Dedra Ayon    Program Notes - ENCOURAGED TO PERFORM 15 minutes a day  On the exhale, engage your pelvic floor muscles and lower abdominals as you zipper up   Do not hold your breath and perform slowly :)    Exercises  · Supine Bridge with Pelvic Floor Contraction - 1 x daily - 7 x weekly - 2 sets - 10 reps  · Sit to Stand with Pelvic Floor Contraction - 1 x daily - 7 x weekly - 2 sets - 10 reps  · Seated Pelvic Floor Contraction with Isometric Hip Adduction - 1 x daily - 7 x weekly - 2 sets - 10 reps  · Seated Pelvic Floor Contraction with Hip Abduction and Resistance Loop - 1 x daily - 7 x weekly - 2 sets - 10 reps  · Sidelying Pelvic Floor Contraction with Hip Abduction - 1 x daily - 7 x weekly - 2 sets - 10 reps  · Quadruped Exhale with Pelvic Floor Contraction - 1 x daily - 7 x weekly - 2 sets - 10 reps  · Hip Hinge Rock Back - 1 x daily - 7 x weekly - 2 sets - 10 reps    Assessment: Tolerated treatment well  Patient has not met all goals but all strategies were reviewed  She presents with good understanding and will be discharged at this time per her request        Goals  STGs to be met in 4 weeks:  * Patient will be compliant with introductory HEP as prescribed  MET  * Patient presents with good understanding of pelvic floor protective strategies to reduce intra-abdominal pressure against pelvic organs  MET  * Patient will demonstrate and report good postural techniques with toileting  MET    LTGs to be met by discharge:  * Patient will present with a  50% improvement on FOTO score by discharge to indicate improved symptom management  * Patient reports that she is able to successfully suppress an urge to empty her bladder for 20' without leakage  NOT MET (5')  * Normalize sEMG findings to indicate strength average > 12uV and resting average < 2 5uV  NA DUE TO EQUIPMENT MALFUNCTION  * Presents with improved nocturia to 1 toiletings/night for more thorough sleep  PARTIALLY MET (FLUID INTAKE DEPENDENT)  * Demonstrates correct isolation and relaxation of pelvic floor to palpation without overflow from global stabilizers  MET  * Patient will use pelvic floor muscles correctly during functional ADLs such as coughing, sneezing, lifting and exercise activities to avoid excessive IAP and PFM strain  MET  * Patient will be compliant with comprehensive home exercise program for self management of condition  ONGOING         Plan: Discharge to self management        Precautions: standard      Manuals 8/22 8/29 9/8 9/15 9/22        PFM cuing   10' -         PFM release   10'          C/R followed by stretching   15'                       Neuro Re-Ed             Biofeedback  23'           DB             Hip abd    1' 1'        Hip add    1' 1'        Quadriped PFM    1' x10        Quadriped hip hinge    1' x15        Quadriped w/ foam and band     1' x15        Ther Ex Nustep  L4 8' 8' L4 8' 10'        bridge    1' 1'        SLR    1' 1'        S/l hip abduction    1' 1'        clamshells    1' 1'        Standing rows    YCO 2x10 YCO 2X10        Standing shoulder ext    YCO 2x10 YCO 2x10        kiel pose             Ther Activity             education anatomy, POC x10'            Protective strategies pressure mgt, bowel mechanics x10' Urge defferal  Urge suppression techniquex 10'  Discharge instructions x10'        Gait Training                                       Modalities

## 2022-10-06 DIAGNOSIS — M77.50 TENDONITIS OF ANKLE OR FOOT: ICD-10-CM

## 2022-10-06 NOTE — TELEPHONE ENCOUNTER
Patient called in stated she will like to continue the Celebrex prescribed and needs a refill please call patient when refill is sent in to pharmacy

## 2022-10-07 RX ORDER — CELECOXIB 200 MG/1
200 CAPSULE ORAL DAILY
Qty: 30 CAPSULE | Refills: 0 | Status: SHIPPED | OUTPATIENT
Start: 2022-10-07

## 2022-10-07 NOTE — TELEPHONE ENCOUNTER
Reviewed prescription request     Based on review, 30 day prescription will be provided, but the patient does need to make an office visit    Please inform patient

## 2022-11-15 DIAGNOSIS — M77.50 TENDONITIS OF ANKLE OR FOOT: ICD-10-CM

## 2022-11-15 NOTE — TELEPHONE ENCOUNTER
Medication: Celebrex  Day supply: 30  Pharmacy: [facility]    Last office visit: 9/7/22  Upcoming office visit: N/A

## 2022-11-15 NOTE — TELEPHONE ENCOUNTER
Last OV with Office: 9/7/2022   Last visit with PCP : 9/7/2022      Next visit with the Office :Visit date not found   Next visit with PCP : Visit date not found    Celebrex was given as a Trial in September  Please advise if refills are appropriate

## 2022-11-16 RX ORDER — CELECOXIB 200 MG/1
200 CAPSULE ORAL DAILY
Qty: 30 CAPSULE | Refills: 0 | Status: SHIPPED | OUTPATIENT
Start: 2022-11-16

## 2022-12-13 DIAGNOSIS — M77.50 TENDONITIS OF ANKLE OR FOOT: ICD-10-CM

## 2022-12-14 RX ORDER — CELECOXIB 200 MG/1
200 CAPSULE ORAL DAILY
Qty: 30 CAPSULE | Refills: 0 | Status: SHIPPED | OUTPATIENT
Start: 2022-12-14

## 2023-01-03 ENCOUNTER — OFFICE VISIT (OUTPATIENT)
Dept: FAMILY MEDICINE CLINIC | Facility: CLINIC | Age: 48
End: 2023-01-03

## 2023-01-03 VITALS
TEMPERATURE: 97.6 F | HEART RATE: 72 BPM | SYSTOLIC BLOOD PRESSURE: 118 MMHG | OXYGEN SATURATION: 95 % | WEIGHT: 180.38 LBS | HEIGHT: 67 IN | DIASTOLIC BLOOD PRESSURE: 80 MMHG | BODY MASS INDEX: 28.31 KG/M2

## 2023-01-03 DIAGNOSIS — Z12.11 SCREEN FOR COLON CANCER: ICD-10-CM

## 2023-01-03 DIAGNOSIS — E78.2 MIXED HYPERLIPIDEMIA: ICD-10-CM

## 2023-01-03 DIAGNOSIS — M79.10 MYALGIA: ICD-10-CM

## 2023-01-03 DIAGNOSIS — E55.9 VITAMIN D DEFICIENCY: ICD-10-CM

## 2023-01-03 DIAGNOSIS — E53.8 B12 DEFICIENCY: Primary | ICD-10-CM

## 2023-01-03 DIAGNOSIS — K58.9 IRRITABLE BOWEL SYNDROME, UNSPECIFIED TYPE: ICD-10-CM

## 2023-01-03 DIAGNOSIS — E05.90 HYPERTHYROIDISM: ICD-10-CM

## 2023-01-03 DIAGNOSIS — C44.92 SQUAMOUS CELL CARCINOMA OF SKIN: ICD-10-CM

## 2023-01-03 DIAGNOSIS — Z71.89 EDUCATED ABOUT COVID-19 VIRUS INFECTION: ICD-10-CM

## 2023-01-03 DIAGNOSIS — Z12.31 ENCOUNTER FOR SCREENING MAMMOGRAM FOR MALIGNANT NEOPLASM OF BREAST: ICD-10-CM

## 2023-01-03 PROBLEM — E78.5 HYPERLIPIDEMIA: Status: ACTIVE | Noted: 2023-01-03

## 2023-01-03 PROBLEM — N90.9 LESION OF FEMALE PERINEUM: Status: RESOLVED | Noted: 2018-10-16 | Resolved: 2023-01-03

## 2023-01-03 RX ORDER — CELECOXIB 200 MG/1
200 CAPSULE ORAL DAILY
Qty: 90 CAPSULE | Refills: 1 | Status: SHIPPED | OUTPATIENT
Start: 2023-01-03

## 2023-01-03 RX ORDER — ESTRADIOL 0.07 MG/D
PATCH, EXTENDED RELEASE TRANSDERMAL
COMMUNITY
Start: 2022-12-31

## 2023-01-03 NOTE — PROGRESS NOTES
Name: Dano Carpio      : 1975      MRN: [id number]  Encounter Provider: Андрей Gordon MD  Encounter Date: 1/3/2023   Encounter department: [facility]    Assessment & Plan     1  B12 deficiency  Assessment & Plan:  Patient does have B12 deficiency  It is minimal at this point  Would recommend oral B12 supplement  Orders:  -     Vitamin B12; Future; Expected date: 2023  -     CBC and differential; Future; Expected date: 2023    2  Vitamin D deficiency  Assessment & Plan:  Patient does have a minor vitamin D deficiency before, but her most recent labs are better  Continue vitamin D supplementation  Orders:  -     Vitamin D 25 hydroxy; Future; Expected date: 2023    3  Hyperthyroidism  Assessment & Plan:  Check TSH in the future  Remains on metoprolol tartrate for heart rate control      Orders:  -     TSH, 3rd generation; Future; Expected date: 2023    4  Squamous cell carcinoma of skin  Assessment & Plan:  Continues to follow with dermatology  5  Irritable bowel syndrome, unspecified type  Assessment & Plan:  Some occasions where she has issues with IBS, but much better than before  This seems to be after starting probiotics, and increasing water  6  Myalgia  Assessment & Plan:  Patient noted that she does have significant aches from head to toe, when she is off Celebrex  This brings up the possibility of polymyalgia versus fibromyalgia  Check laboratory studies to rule out other causes first   Continue Celebrex for now  Orders:  -     Sedimentation rate, automated; Future; Expected date: 2023  -     C-reactive protein; Future; Expected date: 2023  -     CK (with reflex to MB); Future; Expected date: 2023  -     RF Screen w/ Reflex to Titer; Future; Expected date: 2023  -     HEATHER Screen w/ Reflex to Titer/Pattern; Future; Expected date: 2023  -     Lyme Antibody Profile with reflex to WB;  Future; Expected date: 07/03/2023  -     celecoxib (CeleBREX) 200 mg capsule; Take 1 capsule (200 mg total) by mouth daily  -     Comprehensive metabolic panel; Future; Expected date: 07/03/2023    7  Mixed hyperlipidemia  Assessment & Plan:  Cholesterol is minimally elevated with the total, however the LDL is fairly reasonable, as is the HDL (HDL is actually fantastic)  Recheck in 6 months  Continue limiting fried foods, fatty foods, beef, pork  Continue with exercise  Orders:  -     Comprehensive metabolic panel; Future; Expected date: 07/03/2023  -     Lipid panel; Future; Expected date: 07/03/2023    8  Educated about COVID-19 virus infection  Comments:  Recommend vaccination series    9  Encounter for screening mammogram for malignant neoplasm of breast  Comments:  Patient has order at home  10  Screen for colon cancer  Comments:  Patient does have IBS, therefore I would recommend colonoscopy for colon cancer screening  Orders:  -     Ambulatory referral for colonoscopy; Future  -     Ambulatory referral for colonoscopy; Future      BMI Counseling: Body mass index is 28 46 kg/m²  The BMI is above normal  Nutrition recommendations include decreasing portion sizes, encouraging healthy choices of fruits and vegetables, decreasing fast food intake, consuming healthier snacks, limiting drinks that contain sugar, moderation in carbohydrate intake, increasing intake of lean protein, reducing intake of saturated and trans fat and reducing intake of cholesterol  Exercise recommendations include exercising 3-5 times per week  No pharmacotherapy was ordered  Rationale for BMI follow-up plan is due to patient being overweight or obese  Subjective      Chief Complaint   Patient presents with   • Follow-up     Discuss labs   mgb       Patient is here to follow-up on several issues  Patient wondered about continuing Celebrex  She notes that when she comes off the Celebrex, she has significant myalgias    Seems to be all over the place  Has not had any blood work for connective tissue issues  Vitamin D deficiency: Patient is currently on vitamin D supplementation  Her most recent vitamin D level was 30, and she did have a repeat done recently  Hyperlipidemia: Total cholesterol is somewhat elevated  Reviewed the rest of the labs as well  B12: B12 level reviewed  Not on supplementations  Hypothyroid: Has been doing relatively well  She does have metoprolol tartrate for heart rate  Review of Systems   Constitutional: Negative  HENT: Negative  Eyes: Negative  Respiratory: Negative  Cardiovascular: Negative  Gastrointestinal: Negative  Endocrine: Negative  Genitourinary: Negative  Musculoskeletal: Negative  Skin: Negative  Allergic/Immunologic: Negative  Neurological: Negative  Hematological: Negative  Psychiatric/Behavioral: Negative  Current Outpatient Medications on File Prior to Visit   Medication Sig   • ASHWAGANDHA PO Take by mouth   • cholecalciferol (VITAMIN D3) 1,000 units tablet Take 1 tablet (1,000 Units total) by mouth daily   • Carole 0 05 MG/24HR    • Ginkgo Biloba 100 MG CAPS Take by mouth   • MAGNESIUM CITRATE PO Take by mouth   • Misc Natural Products (FIBER 7 PO) Take by mouth   • Probiotic Product (PROBIOTIC-10 PO) Take by mouth   • SELENIUM PO Take by mouth   • Zinc Sulfate (ZINC 15 PO) Take by mouth   • [DISCONTINUED] celecoxib (CeleBREX) 200 mg capsule Take 1 capsule (200 mg total) by mouth daily   • Carole 0 075 MG/24HR    • hydrOXYzine HCL (ATARAX) 25 mg tablet Take 1 tablet (25 mg total) by mouth every 6 (six) hours as needed for itching (Patient not taking: Reported on 9/7/2022)   • metoprolol tartrate (LOPRESSOR) 25 mg tablet        Objective     Blood sugar 81  Creatinine 0 68,   Sodium 139, potassium 4 2, calcium 9 4  AST 22, ALT 28  Total cholesterol 201, , HDL 64, triglycerides 96  Vit B12 210  Nl >211  Vit D 38      BP 118/80 (BP Location: Right arm, Patient Position: Sitting, Cuff Size: Standard)   Pulse 72   Temp 97 6 °F (36 4 °C) (Temporal)   Ht 5' 6 75" (1 695 m)   Wt 81 8 kg (180 lb 6 oz)   SpO2 95%   BMI 28 46 kg/m²     Physical Exam  Vitals and nursing note reviewed  Constitutional:       Appearance: She is well-developed  HENT:      Head: Normocephalic and atraumatic  Cardiovascular:      Rate and Rhythm: Normal rate and regular rhythm  Pulses:           Carotid pulses are 2+ on the right side and 2+ on the left side  Heart sounds: Normal heart sounds  Pulmonary:      Effort: Pulmonary effort is normal       Breath sounds: Normal breath sounds  No wheezing or rales  Chest:      Chest wall: No tenderness         Jarret Rawls MD

## 2023-01-03 NOTE — ASSESSMENT & PLAN NOTE
Cholesterol is minimally elevated with the total, however the LDL is fairly reasonable, as is the HDL (HDL is actually fantastic)  Recheck in 6 months  Continue limiting fried foods, fatty foods, beef, pork  Continue with exercise

## 2023-01-03 NOTE — PATIENT INSTRUCTIONS
1  B12 deficiency  Assessment & Plan:  Patient does have B12 deficiency  It is minimal at this point  Would recommend oral B12 supplement  Orders:  -     Vitamin B12; Future; Expected date: 07/03/2023  -     CBC and differential; Future; Expected date: 07/03/2023    2  Vitamin D deficiency  Assessment & Plan:  Patient does have a minor vitamin D deficiency before, but her most recent labs are better  Continue vitamin D supplementation  Orders:  -     Vitamin D 25 hydroxy; Future; Expected date: 07/03/2023    3  Hyperthyroidism  Assessment & Plan:  Check TSH in the future  Remains on metoprolol tartrate for heart rate control      Orders:  -     TSH, 3rd generation; Future; Expected date: 07/03/2023    4  Squamous cell carcinoma of skin  Assessment & Plan:  Continues to follow with dermatology  5  Irritable bowel syndrome, unspecified type  Assessment & Plan:  Some occasions where she has issues with IBS, but much better than before  This seems to be after starting probiotics, and increasing water  6  Myalgia  Assessment & Plan:  Patient noted that she does have significant aches from head to toe, when she is off Celebrex  This brings up the possibility of polymyalgia versus fibromyalgia  Check laboratory studies to rule out other causes first   Continue Celebrex for now  Orders:  -     Sedimentation rate, automated; Future; Expected date: 07/03/2023  -     C-reactive protein; Future; Expected date: 07/03/2023  -     CK (with reflex to MB); Future; Expected date: 07/03/2023  -     RF Screen w/ Reflex to Titer; Future; Expected date: 07/03/2023  -     HEATHER Screen w/ Reflex to Titer/Pattern; Future; Expected date: 07/03/2023  -     Lyme Antibody Profile with reflex to WB; Future; Expected date: 07/03/2023  -     celecoxib (CeleBREX) 200 mg capsule; Take 1 capsule (200 mg total) by mouth daily  -     Comprehensive metabolic panel; Future; Expected date: 07/03/2023    7   Mixed hyperlipidemia  Assessment & Plan:  Cholesterol is minimally elevated with the total, however the LDL is fairly reasonable, as is the HDL (HDL is actually fantastic)  Recheck in 6 months  Continue limiting fried foods, fatty foods, beef, pork  Continue with exercise  Orders:  -     Comprehensive metabolic panel; Future; Expected date: 07/03/2023  -     Lipid panel; Future; Expected date: 07/03/2023    8  Educated about COVID-19 virus infection  Comments:  Recommend vaccination series    9  Encounter for screening mammogram for malignant neoplasm of breast  Comments:  Patient has order at home  10  Screen for colon cancer  Comments:  Patient does have IBS, therefore I would recommend colonoscopy for colon cancer screening  Orders:  -     Ambulatory referral for colonoscopy; Future  -     Ambulatory referral for colonoscopy; Future        COVID 19 Instructions    Tim Lesch was advised to limit contact with others to essential tasks such as getting food, medications, and medical care  Proper handwashing reviewed, and Hand sanitzer when washing is not available  If the patient develops symptoms of COVID 19, the patient should call the office as soon as possible  For [id number] Flu season, it is strongly recommended that Flu Vaccinations be obtained  Virtual Visits:  Asiya: This works on smart phones (any phone with Internet browsing capability)  You should get a text message when the provider is ready to see you  Click on the link in the text message, and the call should start  You will need to type in your name, and allow camera and microphone access  This is HIPPA compliant, and secure  If you have not already done so, get immunized to COVID 19  We are committed to getting you vaccinated as soon as possible and will be closely following CDC and [facility]  guidelines as they are released and revised    Please refer to our COVID-19 vaccine webpage for the most up to date information on the vaccine and our distribution efforts  This site will also have the most up to date recommendations for COVID booster vaccine  Jeffrey schwab    Call [phone] ([phone]), option 7    OUR NEW LOCATION:    [address], [address] [place], [place] [address] [place], [place], [address]  Fax: [phone]    Lab services and OB/GYN are at this location as well

## 2023-01-03 NOTE — ASSESSMENT & PLAN NOTE
Patient does have a minor vitamin D deficiency before, but her most recent labs are better  Continue vitamin D supplementation

## 2023-01-03 NOTE — ASSESSMENT & PLAN NOTE
Some occasions where she has issues with IBS, but much better than before  This seems to be after starting probiotics, and increasing water

## 2023-01-03 NOTE — ASSESSMENT & PLAN NOTE
Patient noted that she does have significant aches from head to toe, when she is off Celebrex  This brings up the possibility of polymyalgia versus fibromyalgia  Check laboratory studies to rule out other causes first   Continue Celebrex for now

## 2023-03-29 ENCOUNTER — TELEMEDICINE (OUTPATIENT)
Dept: FAMILY MEDICINE CLINIC | Facility: CLINIC | Age: 48
End: 2023-03-29

## 2023-03-29 DIAGNOSIS — U07.1 COVID-19 VIRUS INFECTION: Primary | ICD-10-CM

## 2023-03-29 DIAGNOSIS — Z28.310 UNVACCINATED FOR COVID-19: ICD-10-CM

## 2023-03-29 DIAGNOSIS — F17.200 TOBACCO USE DISORDER: ICD-10-CM

## 2023-03-29 RX ORDER — CLOBETASOL PROPIONATE 0.46 MG/ML
SOLUTION TOPICAL
COMMUNITY
Start: 2023-01-30

## 2023-03-29 RX ORDER — NIRMATRELVIR AND RITONAVIR 300-100 MG
3 KIT ORAL 2 TIMES DAILY
Qty: 30 TABLET | Refills: 0 | Status: SHIPPED | OUTPATIENT
Start: 2023-03-29 | End: 2023-04-03

## 2023-03-29 NOTE — ASSESSMENT & PLAN NOTE
Patient does smoke  With that, she has increased risk of complications due to [id number]  More specifically, she uses a vape device at this time

## 2023-03-29 NOTE — ASSESSMENT & PLAN NOTE
Due to her status is unvaccinated for COVID-19, I do feel that she would need treatment for acute COVID-19, Paxlovid  Reviewed side effects and risks as per discussion of COVID-19  Follow-up in the future  Encouraged vaccination

## 2023-03-29 NOTE — PROGRESS NOTES
COVID-19 Outpatient Progress Note    Assessment/Plan:    Problem List Items Addressed This Visit     COVID-19 virus infection - Primary     Patient does have COVID-19 based on her home test   At this point, I would recommend treatment secondary to her smoking, which puts her at risk for further complications  She does not have significant other issues that would suggest she would need treatment, but with the smoking I am quite concerned about it  She is also not had any vaccination up to this point, significantly increasing her risk for complications due to COVID  Paxlovid EUA reviewed  Copy given to her via her after visit summary  Follow-up in approximately 1 to 2 weeks depending on how she is doing  Reviewed about isolation and masking, i e  5+5  Relevant Medications    nirmatrelvir & ritonavir (Paxlovid, 300/100,) tablet therapy pack    Tobacco use disorder     Patient does smoke  With that, she has increased risk of complications due to [id number]  More specifically, she uses a vape device at this time  Unvaccinated for covid-19     Due to her status is unvaccinated for COVID-19, I do feel that she would need treatment for acute COVID-19, Paxlovid  Reviewed side effects and risks as per discussion of COVID-19  Follow-up in the future  Encouraged vaccination  Disposition:     Patient has asymptomatic or mild COVID-19 infection  Based off CDC guidelines, they were recommended to isolate for 5 days  If they are asymptomatic or symptoms are improving with no fevers in the past 24 hours, isolation may be ended followed by 5 days of wearing a mask when around othes to minimize risk of infecting others  If still have a fever or other symptoms have not improved, continue to isolate until they improve  Regardless of when they end isolation, avoid being around people who are more likely to get very sick from COVID-19 until at least day 11       Discussed symptom directed medication options with patient  Patient meets criteria for PAXLOVID and they have been counseled appropriately according to EUA documentation released by the FDA  After discussion, patient agrees to treatment  Joseline Mcdonald is an investigational medicine used to treat mild-to-moderate COVID-19 in adults and children (15years of age and older weighing at least 80 pounds (40 kg)) with positive results of direct SARS-CoV-2 viral testing, and who are at high risk for progression to severe COVID-19, including hospitalization or death  PAXLOVID is investigational because it is still being studied  There is limited information about the safety and effectiveness of using PAXLOVID to treat people with mild-to-moderate COVID-19  The FDA has authorized the emergency use of PAXLOVID for the treatment of mild-tomoderate COVID-19 in adults and children (15years of age and older weighing at least 80 pounds (40 kg)) with a positive test for the virus that causes COVID-19, and who are at high risk for progression to severe COVID-19, including hospitalization or death, under an EUA  What should I tell my healthcare provider before I take PAXLOVID? Tell your healthcare provider if you:  - Have any allergies  - Have liver or kidney disease  - Are pregnant or plan to become pregnant  - Are breastfeeding a child  - Have any serious illnesses    Tell your healthcare provider about all the medicines you take, including prescription and over-the-counter medicines, vitamins, and herbal supplements  Some medicines may interact with PAXLOVID and may cause serious side effects  Keep a list of your medicines to show your healthcare provider and pharmacist when you get a new medicine  You can ask your healthcare provider or pharmacist for a list of medicines that interact with PAXLOVID  Do not start taking a new medicine without telling your healthcare provider   Your healthcare provider can tell you if it is safe to take PAXLOVID with other medicines  Tell your healthcare provider if you are taking combined hormonal contraceptive  PAXLOVID may affect how your birth control pills work  Females who are able to become pregnant should use another effective alternative form of contraception or an additional barrier method of contraception  Talk to your healthcare provider if you have any questions about contraceptive methods that might be right for you  How do I take PAXLOVID? PAXLOVID consists of 2 medicines: nirmatrelvir and ritonavir  - Take 2 pink tablets of nirmatrelvir with 1 white tablet of ritonavir by mouth 2 times each day (in the morning and in the evening) for 5 days  For each dose, take all 3 tablets at the same time  - If you have kidney disease, talk to your healthcare provider  You may need a different dose  - Swallow the tablets whole  Do not chew, break, or crush the tablets  - Take PAXLOVID with or without food  - Do not stop taking PAXLOVID without talking to your healthcare provider, even if you feel better  - If you miss a dose of PAXLOVID within 8 hours of the time it is usually taken, take it as soon as you remember  If you miss a dose by more than 8 hours, skip the missed dose and take the next dose at your regular time  Do not take 2 doses of PAXLOVID at the same time  - If you take too much PAXLOVID, call your healthcare provider or go to the nearest hospital emergency room right away  - If you are taking a ritonavir- or cobicistat-containing medicine to treat hepatitis C or Human Immunodeficiency Virus (HIV), you should continue to take your medicine as prescribed by your healthcare provider   - Talk to your healthcare provider if you do not feel better or if you feel worse after 5 days  Who should generally not take PAXLOVID? Do not take PAXLOVID if:  You are allergic to nirmatrelvir, ritonavir, or any of the ingredients in PAXLOVID      You are taking any of the following medicines:  - Alfuzosin  - Pethidine, piroxicam, propoxyphene  - Ranolazine  - Amiodarone, dronedarone, flecainide, propafenone, quinidine  - Colchicine  - Lurasidone, pimozide, clozapine  - Dihydroergotamine, ergotamine, methylergonovine  - Lovastatin, simvastatin  - Sildenafil (Revatio®) for pulmonary arterial hypertension (PAH)  - Triazolam, oral midazolam  - Apalutamide  - Carbamazepine, phenobarbital, phenytoin  - Rifampin  - St  Artur’s Wort (hypericum perforatum)    What are the important possible side effects of PAXLOVID? Possible side effects of PAXLOVID are:  - Liver Problems  Tell your healthcare provider right away if you have any of these signs and symptoms of liver problems: loss of appetite, yellowing of your skin and the whites of eyes (jaundice), dark-colored urine, pale colored stools and itchy skin, stomach area (abdominal) pain  - Resistance to HIV Medicines  If you have untreated HIV infection, PAXLOVID may lead to some HIV medicines not working as well in the future  - Other possible side effects include: altered sense of taste, diarrhea, high blood pressure, or muscle aches    These are not all the possible side effects of PAXLOVID  Not many people have taken PAXLOVID  Serious and unexpected side effects may happen  Dima Basil is still being studied, so it is possible that all of the risks are not known at this time  What other treatment choices are there? Like Aditi Mcdonald may allow for the emergency use of other medicines to treat people with COVID-19  Go to https://[employer]/ for information on the emergency use of other medicines that are authorized by FDA to treat people with COVID-19  Your healthcare provider may talk with you about clinical trials for which you may be eligible  It is your choice to be treated or not to be treated with PAXLOVID   Should you decide not to receive it or for your child not to receive it, it will not change your standard medical care  What if I am pregnant or breastfeeding? There is no experience treating pregnant women or breastfeeding mothers with PAXLOVID  For a mother and unborn baby, the benefit of taking PAXLOVID may be greater than the risk from the treatment  If you are pregnant, discuss your options and specific situation with your healthcare provider  It is recommended that you use effective barrier contraception or do not have sexual activity while taking PAXLOVID  If you are breastfeeding, discuss your options and specific situation with your healthcare provider  How do I report side effects with PAXLOVID? Contact your healthcare provider if you have any side effects that bother you or do not go away  Report side effects to FDA MedWatch at www [employer] gov/medwatch or call 5-647-REG4270 or you can report side effects to [employer]  at the contact information provided below  Website Fax number Telephone number   Buz [phone]     How should I store [address]? Store PAXLOVID tablets at room temperature between 68°F to 77°F (20°C to 25°C)  Full fact sheet for patients, parents, and caregivers can be found at: [employer]    I have spent a total time of 20 minutes on the day of the encounter for this patient including       Encounter provider: Jeff Malik MD     Provider located at: [address] [address] [phone]     Recent Visits  No visits were found meeting these conditions  Showing recent visits within past 7 days and meeting all other requirements  Today's Visits  Date Type Provider Dept   03/29/23 Telemedicine Jeff Malik MD [facility]   Showing today's visits and meeting all other requirements  Future Appointments  No visits were found meeting these conditions    Showing future appointments within next 150 days and meeting all other requirements     This virtual check-in was done via [address] and patient was informed that this is a secure, HIPAA-compliant platform  She agrees to proceed  Patient agrees to participate in a virtual check in via telephone or video visit instead of presenting to the office to address urgent/immediate medical needs  Patient is aware this is a billable service  She acknowledged consent and understanding of privacy and security of the video platform  The patient has agreed to participate and understands they can discontinue the visit at any time  After connecting through [facility], the patient was identified by name and date of birth  Roberto Ledesma was informed that this was a telemedicine visit and that the exam was being conducted confidentially over secure lines  My office door was closed  No one else was in the room  Roberto Ledesma acknowledged consent and understanding of privacy and security of the telemedicine visit  I informed the patient that I have reviewed her record in Epic and presented the opportunity for her to ask any questions regarding the visit today  The patient agreed to participate  Verification of patient location:  Patient is located in the following state in which I hold an active license: [place]    Subjective:   Roberto Ledesma is a 50 y o  female who has been screened for COVID-19  Patient's symptoms include fatigue, nasal congestion, rhinorrhea, anosmia (dull), loss of taste (dull), cough (rare), nausea, myalgias and headache  Patient denies fever, chills, sore throat, shortness of breath, chest tightness, abdominal pain, vomiting and diarrhea  - Date of symptom onset: 3/27/2023  - Date of positive COVID-19 test: 3/28/2023  Type of test: Home antigen  Patient with typical symptoms of COVID-19 and they attest that they were positive on home rapid antigen testing   Image of positive result is not able to be uploaded into their chart  COVID-19 vaccination status: Not vaccinated    Selena Blank has been staying home and has isolated themselves in her home  She is taking care to not share personal items and is cleaning all surfaces that are touched often, like counters, tabletops, and doorknobs using household cleaning sprays or wipes  She is wearing a mask when she leaves her room  Patient's father is ill with COVID-19  He is in the hospital   She also now has symptoms starting on Monday  Tested yesterday positive  No results found for: Gabby Devine, [address] [facility], [place], [place], [facility]    Review of Systems   Constitutional: Positive for fatigue  Negative for chills and fever  HENT: Positive for congestion and rhinorrhea  Negative for sore throat  Respiratory: Positive for cough (rare)  Negative for chest tightness and shortness of breath  Gastrointestinal: Positive for nausea  Negative for abdominal pain, diarrhea and vomiting  Musculoskeletal: Positive for myalgias  Neurological: Positive for headaches  Current Outpatient Medications on File Prior to Visit   Medication Sig   • ASHWAGANDHA PO Take by mouth   • celecoxib (CeleBREX) 200 mg capsule Take 1 capsule (200 mg total) by mouth daily   • cholecalciferol (VITAMIN D3) 1,000 units tablet Take 1 tablet (1,000 Units total) by mouth daily   • clobetasol (TEMOVATE) 0 05 % external solution    • Carole 0 05 MG/24HR    • Carole 0 075 MG/24HR    • Ginkgo Biloba 100 MG CAPS Take by mouth   • hydrOXYzine HCL (ATARAX) 25 mg tablet Take 1 tablet (25 mg total) by mouth every 6 (six) hours as needed for itching (Patient not taking: Reported on 9/7/2022)   • MAGNESIUM CITRATE PO Take by mouth   • metoprolol tartrate (LOPRESSOR) 25 mg tablet    • Misc Natural Products (FIBER 7 PO) Take by mouth   • Probiotic Product (PROBIOTIC-10 PO) Take by mouth   • SELENIUM PO Take by mouth   • Zinc Sulfate (ZINC 15 PO) Take by mouth       Objective:     There were no vitals taken for this visit  Physical Exam  Nursing note reviewed  Constitutional:       General: She is not in acute distress  Appearance: She is well-developed  She is not ill-appearing  HENT:      Head: Normocephalic and atraumatic  Eyes:      Conjunctiva/sclera: Conjunctivae normal       Pupils: Pupils are equal, round, and reactive to light  Pulmonary:      Effort: Pulmonary effort is normal       Breath sounds: Normal breath sounds         Carmen Fernandez MD

## 2023-03-29 NOTE — PATIENT INSTRUCTIONS
"   FACT SHEET FOR PATIENTS, PARENTS, AND CAREGIVERS   EMERGENCY USE AUTHORIZATION (EUA) OF PAXLOVID   FOR CORONAVIRUS DISEASE 2019 (COVID-19)   You are being given this Fact Sheet because your healthcare provider believes it is necessary to provide you with PAXLOVID for the treatment of mild-to-moderate coronavirus disease (COVID-19) caused by the SARS-CoV-2 virus  This Fact Sheet contains information to help you understand the risks and benefits of taking the PAXLOVID you have received or may receive  The [place]  Food and Drug Administration (FDA) has issued an Emergency Use Authorization (EUA) to make PAXLOVID available during the COVID-19 pandemic (for more details about an EUA please see \"What is an Emergency Use Authorization? \" at the end of this document)  PAXLOVID is not an FDA-approved medicine in the [place]  Read this Fact Sheet for information about PAXLOVID  Talk to your healthcare provider about your options or if you have any questions  It is your choice to take PAXLOVID  What is COVID-19? COVID-19 is caused by a virus called a coronavirus  You can get COVID-19 through close contact with another person who has the virus  COVID-19 illnesses have ranged from very mild-to-severe, including illness resulting in death  While information so far suggests that most COVID-19 illness is mild, serious illness can happen and may cause some of your other medical conditions to become worse  Older people and people of all ages with severe, long lasting (chronic) medical conditions like heart disease, lung disease, and diabetes, for example seem to be at higher risk of being hospitalized for COVID-19  What is PAXLOVID?    Otf Seay is an investigational medicine used to treat mild-to-moderate COVID-19 in adults and children [15years of age and older weighing at least 80 pounds (36 kg)] with positive results of direct SARS-CoV-2 viral testing, and who are at high risk for progression to severe COVID-19, " including hospitalization or death  PAXLOVID is investigational because it is still being studied  There is limited information about the safety and effectiveness of using PAXLOVID to treat people with mild-to-moderate COVID-19  The FDA has authorized the emergency use of PAXLOVID for the treatment of mild-to- moderate COVID-19 in adults and children [15years of age and older weighing at least 80 pounds (36 kg)] with a positive test for the virus that causes COVID-19, and who are at high risk for progression to severe COVID-19, including hospitalization or death, under an EUA  2   What should I tell my healthcare provider before I take PAXLOVID? Tell your healthcare provider if you:    Have any allergies    Have liver or kidney disease    Are pregnant or plan to become pregnant    Are breastfeeding a child    Have any serious illnesses     Tell your healthcare provider about all the medicines you take, including prescription and over-the-counter medicines, vitamins, and herbal supplements  Some medicines may interact with PAXLOVID and may cause serious side effects  Keep a list of your medicines to show your healthcare provider and pharmacist when you get a new medicine  You can ask your healthcare provider or pharmacist for a list of medicines that interact with PAXLOVID  Do not start taking a new medicine without telling your healthcare provider  Your healthcare provider can tell you if it is safe to take PAXLOVID with other medicines  Tell your healthcare provider if you are taking combined hormonal contraceptive  PAXLOVID may affect how your birth control pills work  Females who are able to become pregnant should use another effective alternative form of contraception or an additional barrier method of contraception  Talk to your healthcare provider if you have any questions about contraceptive methods that might be right for you  How do I take PAXLOVID?     PAXLOVID consists of 2 medicines: nirmatrelvir and ritonavir  o Take 2 pink tablets of nirmatrelvir with 1 white tablet of ritonavir by mouth 2 times each day (in the morning and in the evening) for 5 days  For each dose, take all 3 tablets at the same time  o If you have kidney disease, talk to your healthcare provider  You may need a different dose  Swallow the tablets whole  Do not chew, break, or crush the tablets  Take PAXLOVID with or without food  Do not stop taking PAXLOVID without talking to your healthcare provider, even if you feel better  If you miss a dose of PAXLOVID within 8 hours of the time it is usually taken, take it as soon as you remember  If you miss a dose by more than 8 hours, skip the missed dose and take the next dose at your regular time  Do not take 2 doses of PAXLOVID at the same time  If you take too much PAXLOVID, call your healthcare provider or go to the nearest hospital emergency room right away  If you are taking a ritonavir- or cobicistat-containing medicine to treat hepatitis C or Human Immunodeficiency Virus (HIV), you should continue to take your medicine as prescribed by your healthcare provider  3   Talk to your healthcare provider if you do not feel better or if you feel worse after 5 days  Who should generally not take PAXLOVID? Do not take PAXLOVID if:    You are allergic to nirmatrelvir, ritonavir, or any of the ingredients in PAXLOVID      You are taking any of the following medicines:   o Alfuzosin   o Pethidine, piroxicam, propoxyphene   o Ranolazine   o Amiodarone, dronedarone, flecainide, propafenone, quinidine   o Colchicine   o Lurasidone, pimozide, clozapine   o Dihydroergotamine, ergotamine, methylergonovine   o Lovastatin, simvastatin   o Sildenafil (Revatio®) for pulmonary arterial hypertension (PAH)   o Triazolam, oral midazolam   o Apalutamide   o Carbamazepine, phenobarbital, phenytoin   o Rifampin   o St  Artur's Wort (hypericum perforatum)     Taking PAXLOVID with these medicines may cause serious or life-threatening side effects or affect how PAXLOVID works  These are not the only medicines that may cause serious side effects if taken with PAXLOVID  PAXLOVID may increase or decrease the levels of multiple other medicines  It is very important to tell your healthcare provider about all of the medicines you are taking because additional laboratory tests or changes in the dose of your other medicines may be necessary while you are taking PAXLOVID  Your healthcare provider may also tell you about specific symptoms to watch out for that may indicate that you need to stop or decrease the dose of some of your other medicines  What are the important possible side effects of PAXLOVID? Possible side effects of PAXLOVID are:    Liver Problems  Tell your healthcare provider right away if you have any of these signs and symptoms of liver problems: loss of appetite, yellowing of your skin and the whites of eyes (jaundice), dark-colored urine, pale colored stools and itchy skin, stomach area (abdominal) pain  Resistance to HIV Medicines  If you have untreated HIV infection, PAXLOVID may lead to some HIV medicines not working as well in the future  4    Other possible side effects include:   o altered sense of taste   o diarrhea   o high blood pressure   o muscle aches     These are not all the possible side effects of PAXLOVID  Not many people have taken PAXLOVID  Serious and unexpected side effects may happen  Tessa English is still being studied, so it is possible that all of the risks are not known at this time  What other treatment choices are there? Like Eugene Peña may allow for the emergency use of other medicines to treat people with COVID-19   Go to [facility] for information on the emergency use of other medicines that are authorized by FDA to treat people with COVID-19  Your healthcare provider may talk with you about clinical trials for which you may be eligible  It is your choice to be treated or not to be treated with PAXLOVID  Should you decide not to receive it or for your child not to receive it, it will not change your standard medical care  What if I am pregnant or breastfeeding? There is no experience treating pregnant women or breastfeeding mothers with PAXLOVID  For a mother and unborn baby, the benefit of taking PAXLOVID may be greater than the risk from the treatment  If you are pregnant, discuss your options and specific situation with your healthcare provider  It is recommended that you use effective barrier contraception or do not have sexual activity while taking PAXLOVID  If you are breastfeeding, discuss your options and specific situation with your healthcare provider  How do I report side effects with PAXLOVID? Contact your healthcare provider if you have any side effects that bother you or do not go away  Report side effects to FDA MedWatch at www Pulse gov/medwatch or call 3-526-LFN-4252 or you can report side effects to [facility]  at the contact information provided below  Website  Fax number  Telephone number    [place]  [phone]  [phone]        1  COVID-19 virus infection  Assessment & Plan:  Patient does have COVID-19 based on her home test   At this point, I would recommend treatment secondary to her smoking, which puts her at risk for further complications  She does not have significant other issues that would suggest she would need treatment, but with the smoking I am quite concerned about it  She is also not had any vaccination up to this point, significantly increasing her risk for complications due to COVID  Paxlovid EUA reviewed  Copy given to her via her after visit summary  Follow-up in approximately 1 to 2 weeks depending on how she is doing    Reviewed about isolation and masking, i e  5+5  Orders:  -     nirmatrelvir & ritonavir (Paxlovid, 300/100,) tablet therapy pack; Take 3 tablets by mouth 2 (two) times a day for 5 days Take 2 nirmatrelvir tablets + 1 ritonavir tablet together per dose    2  Tobacco use disorder  Assessment & Plan:  Patient does smoke  With that, she has increased risk of complications due to [id number]  More specifically, she uses a vape device at this time  3  Unvaccinated for covid-19  Assessment & Plan:  Due to her status is unvaccinated for COVID-19, I do feel that she would need treatment for acute COVID-19, Paxlovid  Reviewed side effects and risks as per discussion of COVID-19  Follow-up in the future  Encouraged vaccination  COVID 19 Instructions    Jose Enrique Wilks was advised to limit contact with others to essential tasks such as getting food, medications, and medical care  Proper handwashing reviewed, and Hand sanitzer when washing is not available  If the patient develops symptoms of COVID 19, the patient should call the office as soon as possible  For 0533-8144 Flu season, it is strongly recommended that Flu Vaccinations be obtained  Virtual Visits:  Asiya: This works on smart phones (any phone with Internet browsing capability)  You should get a text message when the provider is ready to see you  Click on the link in the text message, and the call should start  You will need to type in your name, and allow camera and microphone access  This is HIPPA compliant, and secure  If you have not already done so, get immunized to COVID 19  We are committed to getting you vaccinated as soon as possible and will be closely following CDC and [facility]  guidelines as they are released and revised  Please refer to our COVID-19 vaccine webpage for the most up to date information on the vaccine and our distribution efforts      This site will also have the most up to date recommendations for COVID booster vaccine  Jeffrey schwab    Call [phone] ([phone]), option 7    OUR NEW LOCATION:    [address], [facility] [address], [place], [address]  Fax: [phone]    Lab services and OB/GYN are at this location as well

## 2023-03-29 NOTE — ASSESSMENT & PLAN NOTE
Patient does have COVID-19 based on her home test   At this point, I would recommend treatment secondary to her smoking, which puts her at risk for further complications  She does not have significant other issues that would suggest she would need treatment, but with the smoking I am quite concerned about it  She is also not had any vaccination up to this point, significantly increasing her risk for complications due to COVID  Paxlovid EUA reviewed  Copy given to her via her after visit summary  Follow-up in approximately 1 to 2 weeks depending on how she is doing  Reviewed about isolation and masking, i e  5+5

## 2023-04-24 ENCOUNTER — CONSULT (OUTPATIENT)
Dept: GYNECOLOGIC ONCOLOGY | Facility: CLINIC | Age: 48
End: 2023-04-24

## 2023-04-24 VITALS
HEIGHT: 67 IN | WEIGHT: 178 LBS | DIASTOLIC BLOOD PRESSURE: 86 MMHG | TEMPERATURE: 98.4 F | SYSTOLIC BLOOD PRESSURE: 132 MMHG | BODY MASS INDEX: 27.94 KG/M2 | HEART RATE: 76 BPM | RESPIRATION RATE: 98 BRPM

## 2023-04-24 DIAGNOSIS — D07.1 VULVAR INTRAEPITHELIAL NEOPLASIA (VIN) GRADE 3: Primary | ICD-10-CM

## 2023-04-24 RX ORDER — ACETAMINOPHEN 325 MG/1
975 TABLET ORAL ONCE
OUTPATIENT
Start: 2023-04-24 | End: 2023-04-24

## 2023-04-24 RX ORDER — SODIUM CHLORIDE, SODIUM LACTATE, POTASSIUM CHLORIDE, CALCIUM CHLORIDE 600; 310; 30; 20 MG/100ML; MG/100ML; MG/100ML; MG/100ML
125 INJECTION, SOLUTION INTRAVENOUS CONTINUOUS
OUTPATIENT
Start: 2023-04-24

## 2023-04-24 NOTE — PROGRESS NOTES
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  2   I discussed treatment options for severe vulvar dysplasia including resection, CO2 laser, Aldara therapy  3  Based on the risks and benefits of the various approaches, clinical examination, I suggested CO2 laser ablation  4   I discussed the risks and benefits of CO2 laser ablation of the vulvar lesion  She understands the risks and benefits and agrees to proceed as outlined  Consent for surgery was obtained by me in the office  Thank you for the courtesy of this consultation  All questions were answered by the end of the visit         Relevant Orders    Case request operating room: KINJAL Hernandez (Completed)           CHIEF COMPLAINT: Biopsy-proven KHLOE 3          Patient ID: Val Pacheco is a 50 y o  female  47-year-old with a distant history of abnormal Pap smears, most recent Pap of the vagina within normal limits with biopsy-proven KHLOE 3 3/13/2023  She has occasional vulvar itching  She is able to perform her activities of daily living without difficulty  She does not smoke but uses a vape  Review of Systems   Constitutional: Negative for activity change and unexpected weight change  HENT: Negative  Eyes: Negative  Respiratory: Negative  Cardiovascular: Negative  Gastrointestinal: Negative for abdominal distention and abdominal pain  Endocrine: Negative  Genitourinary: Negative for pelvic pain and vaginal bleeding  Musculoskeletal: Negative  Skin: Negative  Allergic/Immunologic: Negative  Neurological: Negative  Hematological: Negative  Psychiatric/Behavioral: Negative          Current Outpatient Medications   Medication Sig Dispense Refill   • celecoxib (CeleBREX) 200 mg capsule Take 1 capsule (200 mg total) by mouth daily 90 capsule 1   • cholecalciferol (VITAMIN D3) 1,000 units tablet Take 1 tablet (1,000 Units total) by mouth daily     • clobetasol (TEMOVATE) 0 05 % external solution      • Carole 0 075 MG/24HR      • metoprolol tartrate (LOPRESSOR) 25 mg tablet      • Misc Natural Products (FIBER 7 PO) Take by mouth     • Probiotic Product (PROBIOTIC-10 PO) Take by mouth     • SELENIUM PO Take by mouth     • Zinc Sulfate (ZINC 15 PO) Take by mouth     • ASHWAGANDHA PO Take by mouth (Patient not taking: Reported on 4/24/2023)     • Carole 0 05 MG/24HR  (Patient not taking: Reported on 4/24/2023)     • Ginkgo Biloba 100 MG CAPS Take by mouth     • hydrOXYzine HCL (ATARAX) 25 mg tablet Take 1 tablet (25 mg total) by mouth every 6 (six) hours as needed for itching (Patient not taking: Reported on 9/7/2022) 30 tablet 0   • MAGNESIUM CITRATE PO Take by mouth (Patient not taking: Reported on 4/24/2023)       Current Facility-Administered Medications   Medication Dose Route Frequency Provider Last Rate Last Admin   • levalbuterol (XOPENEX) inhalation solution 1 25 mg  1 25 mg Nebulization Q8H PRN Andreas Matthews MD   1 25 mg at 02/03/18 0909       Allergies   Allergen Reactions   • Rabeprazole Shortness Of Breath and Tachycardia     Other reaction(s): Hypotension, Tremor, Vomiting   • Chantix  [Varenicline] Nausea Only     Other reaction(s): Anger   • Nsaids      Annotation - [id number]: Avoid due to gastritis, 2004   • Penicillins Hives       Past Medical History:   Diagnosis Date   • Anxiety     LAST ASSESSED: [id number]   • Bleeding hemorrhoids     LAST ASSESSED: [id number]   • Chronic pharyngitis     LAST ASSESSED: [id number]   • Conjunctival hemorrhage     LAST ASSESSED: [id number]   • Eczema     LAST "ASSESSED: [id number]   • Irritable bowel    • Irritable bowel syndrome     LAST ASSESSED: [id number]   • Lesion of female perineum 10/16/2018   • Palpitations    • Polyp of sigmoid colon     LAST ASSESSED: [id number]   • Syncope     LAST ASSESSED: [id number]       Past Surgical History:   Procedure Laterality Date   • COMBINED AUGMENTATION MAMMAPLASTY AND ABDOMINOPLASTY     • TOTAL ABDOMINAL HYSTERECTOMY  2014       OB History        3    Para   1    Term                AB        Living           SAB        IAB        Ectopic        Multiple        Live Births                     Family History   Problem Relation Age of Onset   • No Known Problems Mother    • Lumbar disc disease Father         Status post fusion   • Alcohol abuse Sister    • Diabetes Family        The following portions of the patient's history were reviewed and updated as appropriate: allergies, current medications, past family history, past medical history, past social history, past surgical history and problem list       Objective:    Blood pressure 132/86, pulse 76, temperature 98 4 °F (36 9 °C), temperature source Temporal, resp  rate (!) 98, height 5' 6 75\" (1 695 m), weight 80 7 kg (178 lb)  Body mass index is 28 09 kg/m²  Physical Exam  Vitals reviewed  Constitutional:       General: She is not in acute distress  Appearance: Normal appearance  She is normal weight  She is not ill-appearing  HENT:      Head: Normocephalic and atraumatic  Mouth/Throat:      Mouth: Mucous membranes are moist    Eyes:      General: No scleral icterus  Right eye: No discharge  Left eye: No discharge  Conjunctiva/sclera: Conjunctivae normal    Pulmonary:      Effort: Pulmonary effort is normal    Abdominal:      Palpations: Abdomen is soft        Comments: No palpable inguinal lymphadenopathy   Genitourinary:     Comments: Lesion present at the right aspect of the clitoris measuring approximately 1 cm in " diameter  It does involve the periclitoral tissue  No other visible lesions  Musculoskeletal:      Right lower leg: No edema  Left lower leg: No edema  Skin:     General: Skin is warm and dry  Coloration: Skin is not jaundiced  Findings: No rash  Neurological:      General: No focal deficit present  Mental Status: She is alert and oriented to person, place, and time  Cranial Nerves: No cranial nerve deficit  Motor: No weakness  Gait: Gait normal    Psychiatric:         Mood and Affect: Mood normal          Behavior: Behavior normal          Thought Content: Thought content normal          Judgment: Judgment normal             Colposcopy     Date/Time 4/24/2023 8:47 AM     Universal Protocol   Consent: Verbal consent obtained  Consent given by: patient  Patient understanding: patient states understanding of the procedure being performed       Performed by  Anabela Cormier MD     Authorized by Anabela Cormier MD        Indication    Indications: KHLOE 3  Procedure Details   Procedure: Colposcopy of Vulva only      Biopsy(s): no      Specimen to pathology: no       Post-procedure      Findings: Mosaicism and White epithelium      Impression comment:  KHLOE 3    Patient tolerance of procedure: Tolerated well, no immediate complications     Comments       5% acetic acid was applied to the entire vulvar skin  There was an isolated lesion measuring approximately 1 x 1 5 cm at the right aspect of the clitoris  No other visible lesions identified

## 2023-04-24 NOTE — ASSESSMENT & PLAN NOTE
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

## 2023-05-17 NOTE — PRE-PROCEDURE INSTRUCTIONS
Pre-Surgery Instructions:   Medication Instructions   • celecoxib (CeleBREX) 200 mg capsule Instructed to avoid NSAIDs 3 days prior to surgery   • cholecalciferol (VITAMIN D3) 1,000 units tablet Instructed to avoid all ASA and OTC Vit/Supp 1 week prior to surgery and to avoid NSAIDs 3 days prior to surgery per anesthesia instructions  Tylenol ok to take prn  • Ginkgo Biloba 100 MG CAPS Instructed to avoid all ASA and OTC Vit/Supp 1 week prior to surgery and to avoid NSAIDs 3 days prior to surgery per anesthesia instructions  Tylenol ok to take prn  • metoprolol tartrate (LOPRESSOR) 25 mg tablet Instructed to take per normal schedule including DOS with sips water   • Misc Natural Products (FIBER 7 PO) Instructed to avoid all ASA and OTC Vit/Supp 1 week prior to surgery and to avoid NSAIDs 3 days prior to surgery per anesthesia instructions  Tylenol ok to take prn  • Probiotic Product (PROBIOTIC-10 PO) Instructed to avoid all ASA and OTC Vit/Supp 1 week prior to surgery and to avoid NSAIDs 3 days prior to surgery per anesthesia instructions  Tylenol ok to take prn  • SELENIUM PO Instructed to avoid all ASA and OTC Vit/Supp 1 week prior to surgery and to avoid NSAIDs 3 days prior to surgery per anesthesia instructions  Tylenol ok to take prn  • Zinc Sulfate (ZINC 15 PO) Instructed to avoid all ASA and OTC Vit/Supp 1 week prior to surgery and to avoid NSAIDs 3 days prior to surgery per anesthesia instructions  Tylenol ok to take prn  Medication instructions for day surgery reviewed  Please use only a sip of water to take your instructed medications  Avoid all over the counter vitamins, supplements and NSAIDS for one week prior to surgery per anesthesia guidelines  Tylenol is ok to take as needed  You will receive a call one business day prior to surgery with an arrival time and hospital directions   If your surgery is scheduled on a Monday, the hospital will be calling you on the Friday prior to your surgery  If you have not heard from anyone by 8pm, please call the hospital supervisor through the hospital  at [phone]  Wan Henson [phone])  Do not eat or drink anything after midnight the night before your surgery, including candy, mints, lifesavers, or chewing gum  Do not drink alcohol 24hrs before your surgery  Try not to smoke at least 24hrs before your surgery  Follow the pre surgery showering instructions as listed in the [facility] Surgical Experience Booklet” or otherwise provided by your surgeon's office  Do not shave the surgical area 24 hours before surgery  Do not apply any lotions, creams, including makeup, cologne, deodorant, or perfumes after showering on the day of your surgery  No contact lenses, eye make-up, or artificial eyelashes  Remove nail polish, including gel polish, and any artificial, gel, or acrylic nails if possible  Remove all jewelry including rings and body piercing jewelry  Wear causal clothing that is easy to take on and off  Consider your type of surgery  Keep any valuables, jewelry, piercings at home  Please bring any specially ordered equipment (sling, braces) if indicated  Arrange for a responsible person to drive you to and from the hospital on the day of your surgery  Visitor Guidelines discussed  Call the surgeon's office with any new illnesses, exposures, or additional questions prior to surgery  Please reference your [facility] Surgical Experience Booklet” for additional information to prepare for your upcoming surgery

## 2023-05-25 ENCOUNTER — ANESTHESIA EVENT (OUTPATIENT)
Dept: PERIOP | Facility: HOSPITAL | Age: 48
End: 2023-05-25

## 2023-05-25 NOTE — ANESTHESIA PREPROCEDURE EVALUATION
Procedure:  ABLATION VULVA,LASER CO2 (Vulva)    Relevant Problems   ANESTHESIA (within normal limits)      CARDIO   (+) Hyperlipidemia      ENDO   (+) Hyperthyroidism      Genitourinary   (+) Vulvar intraepithelial neoplasia (KHLOE) grade 3      Other   (+) Tobacco use disorder        Physical Exam    Airway    Mallampati score: II  TM Distance: >3 FB  Neck ROM: full     Dental       Cardiovascular  Rhythm: regular, Rate: normal, Cardiovascular exam normal    Pulmonary  Pulmonary exam normal Breath sounds clear to auscultation,     Other Findings        Anesthesia Plan  ASA Score- 2     Anesthesia Type- general with ASA Monitors  Additional Monitors:   Airway Plan: LMA  Plan Factors-Exercise tolerance (METS): >4 METS  Chart reviewed  Existing labs reviewed  Patient summary reviewed  Patient is a current smoker  Patient instructed to abstain from smoking on day of procedure  Patient smoked on day of surgery  Induction- intravenous  Postoperative Plan- Plan for postoperative opioid use  Planned trial extubation    Informed Consent- Anesthetic plan and risks discussed with patient  I personally reviewed this patient with the CRNA  Discussed and agreed on the Anesthesia Plan with the CRNA  Louie Cornell

## 2023-05-26 ENCOUNTER — ANESTHESIA (OUTPATIENT)
Dept: PERIOP | Facility: HOSPITAL | Age: 48
End: 2023-05-26

## 2023-05-26 ENCOUNTER — HOSPITAL ENCOUNTER (OUTPATIENT)
Facility: HOSPITAL | Age: 48
Setting detail: OUTPATIENT SURGERY
Discharge: HOME | End: 2023-05-26
Attending: OBSTETRICS & GYNECOLOGY | Admitting: OBSTETRICS & GYNECOLOGY

## 2023-05-26 VITALS
HEART RATE: 69 BPM | RESPIRATION RATE: 17 BRPM | TEMPERATURE: 96.4 F | HEIGHT: 67 IN | OXYGEN SATURATION: 99 % | WEIGHT: 178 LBS | BODY MASS INDEX: 27.94 KG/M2 | SYSTOLIC BLOOD PRESSURE: 96 MMHG | DIASTOLIC BLOOD PRESSURE: 62 MMHG

## 2023-05-26 DIAGNOSIS — D07.1 VULVAR INTRAEPITHELIAL NEOPLASIA (VIN) GRADE 3: ICD-10-CM

## 2023-05-26 RX ORDER — HYDROMORPHONE HCL IN WATER/PF 6 MG/30 ML
0.2 PATIENT CONTROLLED ANALGESIA SYRINGE INTRAVENOUS
Status: DISCONTINUED | OUTPATIENT
Start: 2023-05-26 | End: 2023-05-26 | Stop reason: HOSPADM

## 2023-05-26 RX ORDER — FENTANYL CITRATE 50 UG/ML
INJECTION, SOLUTION INTRAMUSCULAR; INTRAVENOUS AS NEEDED
Status: DISCONTINUED | OUTPATIENT
Start: 2023-05-26 | End: 2023-05-26

## 2023-05-26 RX ORDER — ONDANSETRON 2 MG/ML
INJECTION INTRAMUSCULAR; INTRAVENOUS AS NEEDED
Status: DISCONTINUED | OUTPATIENT
Start: 2023-05-26 | End: 2023-05-26

## 2023-05-26 RX ORDER — ACETAMINOPHEN 325 MG/1
975 TABLET ORAL EVERY 6 HOURS PRN
Status: DISCONTINUED | OUTPATIENT
Start: 2023-05-26 | End: 2023-05-26 | Stop reason: HOSPADM

## 2023-05-26 RX ORDER — PROMETHAZINE HYDROCHLORIDE 25 MG/ML
12.5 INJECTION, SOLUTION INTRAMUSCULAR; INTRAVENOUS ONCE AS NEEDED
Status: DISCONTINUED | OUTPATIENT
Start: 2023-05-26 | End: 2023-05-26 | Stop reason: HOSPADM

## 2023-05-26 RX ORDER — HYDROMORPHONE HCL/PF 1 MG/ML
0.5 SYRINGE (ML) INJECTION
Status: DISCONTINUED | OUTPATIENT
Start: 2023-05-26 | End: 2023-05-26 | Stop reason: HOSPADM

## 2023-05-26 RX ORDER — DEXAMETHASONE SODIUM PHOSPHATE 10 MG/ML
INJECTION, SOLUTION INTRAMUSCULAR; INTRAVENOUS AS NEEDED
Status: DISCONTINUED | OUTPATIENT
Start: 2023-05-26 | End: 2023-05-26

## 2023-05-26 RX ORDER — MIDAZOLAM HYDROCHLORIDE 2 MG/2ML
INJECTION, SOLUTION INTRAMUSCULAR; INTRAVENOUS AS NEEDED
Status: DISCONTINUED | OUTPATIENT
Start: 2023-05-26 | End: 2023-05-26

## 2023-05-26 RX ORDER — ALBUTEROL SULFATE 2.5 MG/3ML
2.5 SOLUTION RESPIRATORY (INHALATION) ONCE AS NEEDED
Status: DISCONTINUED | OUTPATIENT
Start: 2023-05-26 | End: 2023-05-26 | Stop reason: HOSPADM

## 2023-05-26 RX ORDER — ONDANSETRON 2 MG/ML
4 INJECTION INTRAMUSCULAR; INTRAVENOUS ONCE AS NEEDED
Status: DISCONTINUED | OUTPATIENT
Start: 2023-05-26 | End: 2023-05-26 | Stop reason: HOSPADM

## 2023-05-26 RX ORDER — LABETALOL HYDROCHLORIDE 5 MG/ML
10 INJECTION, SOLUTION INTRAVENOUS
Status: DISCONTINUED | OUTPATIENT
Start: 2023-05-26 | End: 2023-05-26 | Stop reason: HOSPADM

## 2023-05-26 RX ORDER — SODIUM CHLORIDE, SODIUM LACTATE, POTASSIUM CHLORIDE, CALCIUM CHLORIDE 600; 310; 30; 20 MG/100ML; MG/100ML; MG/100ML; MG/100ML
INJECTION, SOLUTION INTRAVENOUS CONTINUOUS PRN
Status: DISCONTINUED | OUTPATIENT
Start: 2023-05-26 | End: 2023-05-26

## 2023-05-26 RX ORDER — PROPOFOL 10 MG/ML
INJECTION, EMULSION INTRAVENOUS AS NEEDED
Status: DISCONTINUED | OUTPATIENT
Start: 2023-05-26 | End: 2023-05-26

## 2023-05-26 RX ORDER — SODIUM CHLORIDE, SODIUM LACTATE, POTASSIUM CHLORIDE, CALCIUM CHLORIDE 600; 310; 30; 20 MG/100ML; MG/100ML; MG/100ML; MG/100ML
125 INJECTION, SOLUTION INTRAVENOUS CONTINUOUS
Status: DISCONTINUED | OUTPATIENT
Start: 2023-05-26 | End: 2023-05-26 | Stop reason: HOSPADM

## 2023-05-26 RX ORDER — METOCLOPRAMIDE HYDROCHLORIDE 5 MG/ML
10 INJECTION INTRAMUSCULAR; INTRAVENOUS ONCE AS NEEDED
Status: DISCONTINUED | OUTPATIENT
Start: 2023-05-26 | End: 2023-05-26 | Stop reason: HOSPADM

## 2023-05-26 RX ORDER — LIDOCAINE HYDROCHLORIDE 10 MG/ML
INJECTION, SOLUTION EPIDURAL; INFILTRATION; INTRACAUDAL; PERINEURAL AS NEEDED
Status: DISCONTINUED | OUTPATIENT
Start: 2023-05-26 | End: 2023-05-26

## 2023-05-26 RX ORDER — ONDANSETRON 2 MG/ML
4 INJECTION INTRAMUSCULAR; INTRAVENOUS EVERY 6 HOURS PRN
Status: CANCELLED | OUTPATIENT
Start: 2023-05-26

## 2023-05-26 RX ORDER — FENTANYL CITRATE/PF 50 MCG/ML
25 SYRINGE (ML) INJECTION
Status: DISCONTINUED | OUTPATIENT
Start: 2023-05-26 | End: 2023-05-26 | Stop reason: HOSPADM

## 2023-05-26 RX ORDER — HYDRALAZINE HYDROCHLORIDE 20 MG/ML
5 INJECTION INTRAMUSCULAR; INTRAVENOUS
Status: DISCONTINUED | OUTPATIENT
Start: 2023-05-26 | End: 2023-05-26 | Stop reason: HOSPADM

## 2023-05-26 RX ORDER — ACETAMINOPHEN 325 MG/1
975 TABLET ORAL ONCE
Status: COMPLETED | OUTPATIENT
Start: 2023-05-26 | End: 2023-05-26

## 2023-05-26 RX ORDER — OXYCODONE HYDROCHLORIDE 5 MG/1
5 TABLET ORAL EVERY 6 HOURS PRN
Qty: 15 TABLET | Refills: 0 | Status: SHIPPED | OUTPATIENT
Start: 2023-05-26 | End: 2023-06-05

## 2023-05-26 RX ORDER — ACETIC ACID 5 %
LIQUID (ML) MISCELLANEOUS AS NEEDED
Status: DISCONTINUED | OUTPATIENT
Start: 2023-05-26 | End: 2023-05-26 | Stop reason: HOSPADM

## 2023-05-26 RX ORDER — KETOROLAC TROMETHAMINE 30 MG/ML
INJECTION, SOLUTION INTRAMUSCULAR; INTRAVENOUS AS NEEDED
Status: DISCONTINUED | OUTPATIENT
Start: 2023-05-26 | End: 2023-05-26

## 2023-05-26 RX ADMIN — Medication 10 MG: at 07:42

## 2023-05-26 RX ADMIN — MIDAZOLAM 2 MG: 1 INJECTION INTRAMUSCULAR; INTRAVENOUS at 07:25

## 2023-05-26 RX ADMIN — DEXAMETHASONE SODIUM PHOSPHATE 10 MG: 10 INJECTION, SOLUTION INTRAMUSCULAR; INTRAVENOUS at 07:35

## 2023-05-26 RX ADMIN — LIDOCAINE HYDROCHLORIDE 50 MG: 10 INJECTION, SOLUTION EPIDURAL; INFILTRATION; INTRACAUDAL; PERINEURAL at 07:34

## 2023-05-26 RX ADMIN — PROPOFOL 70 MG: 10 INJECTION, EMULSION INTRAVENOUS at 07:35

## 2023-05-26 RX ADMIN — PROPOFOL 130 MG: 10 INJECTION, EMULSION INTRAVENOUS at 07:34

## 2023-05-26 RX ADMIN — SODIUM CHLORIDE, SODIUM LACTATE, POTASSIUM CHLORIDE, AND CALCIUM CHLORIDE: .6; .31; .03; .02 INJECTION, SOLUTION INTRAVENOUS at 07:16

## 2023-05-26 RX ADMIN — FENTANYL CITRATE 50 MCG: 50 INJECTION INTRAMUSCULAR; INTRAVENOUS at 07:52

## 2023-05-26 RX ADMIN — FENTANYL CITRATE 25 MCG: 50 INJECTION INTRAMUSCULAR; INTRAVENOUS at 07:34

## 2023-05-26 RX ADMIN — Medication 10 MG: at 07:50

## 2023-05-26 RX ADMIN — ACETAMINOPHEN 975 MG: 325 TABLET ORAL at 06:01

## 2023-05-26 RX ADMIN — FENTANYL CITRATE 25 MCG: 50 INJECTION INTRAMUSCULAR; INTRAVENOUS at 08:09

## 2023-05-26 RX ADMIN — KETOROLAC TROMETHAMINE 30 MG: 30 INJECTION, SOLUTION INTRAMUSCULAR; INTRAVENOUS at 08:01

## 2023-05-26 RX ADMIN — PROPOFOL 70 MG: 10 INJECTION, EMULSION INTRAVENOUS at 07:52

## 2023-05-26 RX ADMIN — ONDANSETRON 4 MG: 2 INJECTION INTRAMUSCULAR; INTRAVENOUS at 07:54

## 2023-05-26 NOTE — ANESTHESIA POSTPROCEDURE EVALUATION
Post-Op Assessment Note    CV Status:  Stable  Pain Score: 1    Pain management: adequate     Mental Status:  Alert and awake   Hydration Status:  Euvolemic   PONV Controlled:  Controlled   Airway Patency:  Patent      Post Op Vitals Reviewed: Yes      Staff: CRNA         No notable events documented      BP   113/61   Temp 96 8   Pulse 96   Resp 18   SpO2 97% room air

## 2023-05-26 NOTE — OP NOTE
OPERATIVE REPORT  PATIENT NAME: Avel Winter    :  1975  MRN: [id number]  Pt Location: [facility] OR ROOM 08    SURGERY DATE: 2023    Surgeon(s) and Role:     * Anne Hawk MD - Primary     * Florencia Antonio MD - Assisting    Preop Diagnosis:  Vulvar intraepithelial neoplasia (KHLOE) grade 3 [D07 1]    Post-Op Diagnosis Codes:     * Vulvar intraepithelial neoplasia (KHLOE) grade 3 [D07 1]    Procedure(s):  ABLATION VULVA  LASER CO2  VULVECTOMY SIMPLE    Specimen(s):  ID Type Source Tests Collected by Time Destination   1 : left vulva Tissue Vulva TISSUE EXAM Anne Hawk MD 2023 [id number]        Estimated Blood Loss:   Minimal    Drains:  * No LDAs found *    Anesthesia Type:   General/LMA    Operative Indications:  Vulvar intraepithelial neoplasia (KHLOE) grade 3 [D07 1]    Operative Findings:  Approximately 2 x 2 cm of acetowhite changes appreciated on the right vulva just adjacent to the clitoris   2 x 3 cm area of acetowhite changes appreciated on the inferior aspect of the left vulva  Hemostatic excision sites     Complications:   None Apparent     Procedure and Technique:  49yo with hx of KHLOE III  The results of her diagnostic testing, her differential diagnosis and treatment options, and the benefits and risks of these were reviewed  She has a good understanding and has agreed to proceed with surgical management       Procedure and Technique:  After obtaining informed consent and discussing the risks and benefits of the above procedure in the outpatient setting, the patient was met in the pre-operative holding area where she was properly identified by the surgical and anesthesia teams  The patient was taken to the operating room where anesthesia was obtained without difficulty  A patient safety time-out was performed with all members of the team present and in agreement  She was placed in dorsal lithotomy position using [place] stirrups   The perineum and vagina were prepped with acetic acid  There was noted to be a 2 x 2 centimeter area of acetowhite changes on the right vulvar just lateral to the clitoris  Using CO2 laser at 8 W of continuous setting the area was completely ablated  Was then turned to the 2 x 3 cm plaque appreciated on the inferior aspect of the left vulva  Using Bovie electrocautery a 3 x 4 cm sample was excised to allow for appropriate margins  The excision site was then closed in a single layer using a series of vertical mattress sutures  Excellent hemostasis was achieved  The conclusion of the procedure silvadene was applied to the ablated are  All sponge, needle and instrument counts were correct x2  Anesthesia was reversed and the patient was taken to the PACU in a stable condition        Patient Disposition:  PACU         SIGNATURE: Kathy Parra MD  DATE: May 26, 2023  TIME: 8:10 AM

## 2023-05-26 NOTE — ASSESSMENT & PLAN NOTE
61-year-old with biopsy-proven KHLOE 3 adjacent to the right aspect of the clitoris  Her performance status is 0   1   Plan for CO2 laser ablation of vulvar dysplasia

## 2023-05-26 NOTE — H&P
"Assessment/Plan:    58-year-old with biopsy-proven KHLOE 3 adjacent to the right aspect of the clitoris  Her performance status is 0   1   Plan for CO2 laser ablation of vulvar dysplasia  CHIEF COMPLAINT: Here for surgery          Patient ID: Sim Mcdonough is a 46 y o  female  Presents for surgery  There is been no interval change in medications or medical history since her last visit to the office  No complaints today  Review of Systems   Constitutional: Negative for activity change and unexpected weight change  HENT: Negative  Eyes: Negative  Respiratory: Negative  Cardiovascular: Negative  Gastrointestinal: Negative for abdominal distention and abdominal pain  Endocrine: Negative  Genitourinary: Negative for pelvic pain and vaginal bleeding  Musculoskeletal: Negative  Skin: Negative  Allergic/Immunologic: Negative  Neurological: Negative  Hematological: Negative  Psychiatric/Behavioral: Negative          Current Facility-Administered Medications   Medication Dose Route Frequency Provider Last Rate Last Admin   • lactated ringers infusion  125 mL/hr Intravenous Continuous [place] MD Ariel           Allergies   Allergen Reactions   • Rabeprazole Shortness Of Breath and Tachycardia     Other reaction(s): Hypotension, Tremor, Vomiting   • Chantix  [Varenicline] Nausea Only     Other reaction(s): Anger   • Nsaids      Annotation - [id number]: Avoid due to gastritis, 2004   • Penicillins Hives       Past Medical History:   Diagnosis Date   • Anxiety     LAST ASSESSED: [id number]   • Bleeding hemorrhoids     LAST ASSESSED: [id number]   • Chronic pharyngitis     LAST ASSESSED: [id number]   • Conjunctival hemorrhage     LAST ASSESSED: [id number]   • COVID 03/2022    Cold sx - Pt states \"last week of March\"   • Eczema     LAST ASSESSED: [id number]   • Irritable bowel    • Irritable bowel syndrome     LAST ASSESSED: [id number]   • Lesion of female perineum 10/16/2018   • " "Palpitations    • Polyp of sigmoid colon     LAST ASSESSED: [id number]   • Syncope     LAST ASSESSED: [id number]       Past Surgical History:   Procedure Laterality Date   • COMBINED AUGMENTATION MAMMAPLASTY AND ABDOMINOPLASTY     • TOTAL ABDOMINAL HYSTERECTOMY  2014       OB History        3    Para   1    Term                AB        Living           SAB        IAB        Ectopic        Multiple        Live Births                     Family History   Problem Relation Age of Onset   • No Known Problems Mother    • Lumbar disc disease Father         Status post fusion   • Alcohol abuse Sister    • Diabetes Family        The following portions of the patient's history were reviewed and updated as appropriate: allergies, current medications, past family history, past medical history, past social history, past surgical history and problem list       Objective:    Blood pressure 97/65, pulse 69, temperature 98 6 °F (37 °C), temperature source Temporal, resp  rate 18, height 5' 6 75\" (1 695 m), weight 80 7 kg (178 lb), SpO2 96 %  Body mass index is 28 09 kg/m²  Physical Exam  Vitals reviewed  Constitutional:       General: She is not in acute distress  Appearance: Normal appearance  She is not ill-appearing  HENT:      Head: Normocephalic and atraumatic  Mouth/Throat:      Mouth: Mucous membranes are moist    Eyes:      General: No scleral icterus  Right eye: No discharge  Left eye: No discharge  Conjunctiva/sclera: Conjunctivae normal    Cardiovascular:      Rate and Rhythm: Normal rate and regular rhythm  Heart sounds: Normal heart sounds  Pulmonary:      Effort: Pulmonary effort is normal       Breath sounds: Normal breath sounds  Abdominal:      Palpations: Abdomen is soft  Musculoskeletal:      Right lower leg: No edema  Left lower leg: No edema  Skin:     General: Skin is warm and dry  Coloration: Skin is not jaundiced        Findings: No " "rash    Neurological:      General: No focal deficit present  Mental Status: She is alert and oriented to person, place, and time  Cranial Nerves: No cranial nerve deficit  Motor: No weakness  Gait: Gait normal    Psychiatric:         Mood and Affect: Mood normal          Behavior: Behavior normal          Thought Content:  Thought content normal          Judgment: Judgment normal            No results found for: \"\"  Lab Results   Component Value Date    HCT 41 0 09/26/2016    HGB 14 1 09/26/2016    MCV 95 09/26/2016     09/26/2016    WBC 7 08 09/26/2016     Lab Results   Component Value Date    BUN 12 09/26/2016    CALCIUM 9 1 09/26/2016     09/26/2016    CO2 30 09/26/2016    CREATININE 0 74 09/26/2016    EGFR >60 0 09/26/2016    K 4 1 09/26/2016        Trend:  No results found for: \"\"      "

## 2023-06-02 ENCOUNTER — OFFICE VISIT (OUTPATIENT)
Dept: GYNECOLOGIC ONCOLOGY | Facility: CLINIC | Age: 48
End: 2023-06-02

## 2023-06-02 ENCOUNTER — TELEPHONE (OUTPATIENT)
Dept: OTHER | Facility: OTHER | Age: 48
End: 2023-06-02

## 2023-06-02 VITALS
TEMPERATURE: 98 F | WEIGHT: 175 LBS | OXYGEN SATURATION: 98 % | DIASTOLIC BLOOD PRESSURE: 82 MMHG | SYSTOLIC BLOOD PRESSURE: 140 MMHG | HEART RATE: 96 BPM | HEIGHT: 67 IN | BODY MASS INDEX: 27.47 KG/M2

## 2023-06-02 DIAGNOSIS — Z09 POSTOP CHECK: Primary | ICD-10-CM

## 2023-06-02 RX ORDER — ESTRADIOL 0.1 MG/D
PATCH, EXTENDED RELEASE TRANSDERMAL
COMMUNITY
Start: 2023-05-25

## 2023-06-02 NOTE — PROGRESS NOTES
Assessment/Plan:    Problem List Items Addressed This Visit        Other    Postop check - Primary     20-year-old with biopsy-proven KHLOE 3 who is s/p CO2 laser ablation and simple vulvectomy on 5/26/22  Pathology c/w HSIL grad 2-3, margins uninvolved  She presents today as her posterior left vulvectomy site has   On exam, this is a superficial separation measuring approximately 2 5 cm  Wound bed is shallow, with granulation tissue noted, and appears healthy  Laser ablation site just adjacent to the clitoris on the right has a skin defect measuring approximately 1cm  No drainage or stigmata of infection  Patient will return to the office as scheduled for her next postoperative visit  She will call the office in the interm  Infection precautions given  We discussed abstaining from strenuous exercise over the next 2 weeks as the wound continues to heal               CHIEF COMPLAINT: Postop check       Problem:  Cancer Staging   No matching staging information was found for the patient  Previous therapy:  Oncology History    No history exists  Patient ID: Antonina Grier is a 46 y o  female     Patient presents with concern regarding incisional separation at her vulvectomy site  She noticed this within the last 24 hours  Denies increased pain, bleeding, or purulent drainage  Pain is under satisfactory control  She has been afebrile  She denies nausea or vomiting  Her appetite is appropriate  She reports normal bowel and bladder function  She is ambulatory  The following portions of the patient's history were reviewed and updated as appropriate: allergies, current medications, past family history, past medical history, past social history, past surgical history and problem list     Review of Systems   Constitutional: Negative for chills, fatigue and fever  Respiratory: Negative  Cardiovascular: Negative      Genitourinary: Negative for difficulty urinating, dysuria, frequency, "hematuria, pelvic pain, urgency, vaginal bleeding, vaginal discharge and vaginal pain  Vulvar discomfort   Psychiatric/Behavioral: Negative  Current Outpatient Medications:   •  celecoxib (CeleBREX) 200 mg capsule, Take 1 capsule (200 mg total) by mouth daily, Disp: 90 capsule, Rfl: 1  •  cholecalciferol (VITAMIN D3) 1,000 units tablet, Take 1 tablet (1,000 Units total) by mouth daily, Disp: , Rfl:   •  clobetasol (TEMOVATE) 0 05 % external solution, , Disp: , Rfl:   •  Carole 0 1 MG/24HR, , Disp: , Rfl:   •  metoprolol tartrate (LOPRESSOR) 25 mg tablet, , Disp: , Rfl:   •  Misc Natural Products (FIBER 7 PO), Take by mouth, Disp: , Rfl:   •  Probiotic Product (PROBIOTIC-10 PO), Take by mouth, Disp: , Rfl:   •  SELENIUM PO, Take by mouth, Disp: , Rfl:   •  Zinc Sulfate (ZINC 15 PO), Take by mouth, Disp: , Rfl:   •  Carole 0 075 MG/24HR, , Disp: , Rfl:   •  Ginkgo Biloba 100 MG CAPS, Take by mouth, Disp: , Rfl:   •  oxyCODONE (Roxicodone) 5 immediate release tablet, Take 1 tablet (5 mg total) by mouth every 6 (six) hours as needed for severe pain for up to 10 days Max Daily Amount: 20 mg (Patient not taking: Reported on 6/2/2023), Disp: 15 tablet, Rfl: 0    Current Facility-Administered Medications:   •  levalbuterol (XOPENEX) inhalation solution 1 25 mg, 1 25 mg, Nebulization, Q8H PRN, Oralia Ennis MD, 1 25 mg at 02/03/18 0909    Objective:    Blood pressure 140/82, pulse 96, temperature 98 °F (36 7 °C), temperature source Temporal, height 5' 6 75\" (1 695 m), weight 79 4 kg (175 lb), SpO2 98 %  Body mass index is 27 61 kg/m²  Body surface area is 1 91 meters squared  Physical Exam  Constitutional:       Appearance: She is well-developed  Pulmonary:      Effort: Pulmonary effort is normal  No respiratory distress  Genitourinary:     Vagina: No vaginal discharge  Comments: Status-post vulvectomy   Posterior left vulvectomy incision is superficially , measuring approximately 2 5cm  " Wound bed with healthy granulation tissue  1cm skin defect adjacent to the clitoris at ablation site on the right anterior vulva  No erythema, purulent drainage, or edema to suggest infection  Skin:     General: Skin is warm and dry  Coloration: Skin is not pale  Findings: No erythema or rash  Neurological:      Mental Status: She is alert and oriented to person, place, and time  Psychiatric:         Behavior: Behavior normal          Thought Content: Thought content normal          Judgment: Judgment normal          Pathology:    Case Report   Surgical Pathology Report                         Case: [id number]                                    Authorizing Provider: Darlene Schaefer   Collected:           05/26/2023 0758                                      MD                                                                            Ordering Location:     [address]      Received:            05/26/2023 0913                                      [facility] Operating Room                                                       Pathologist:           Heide Irizarry MD                                                          Specimen:    Vulva, left vulva                                                                          Final Diagnosis   A  Vulva, left vulva lesion, simple vulvectomy:  - High-grade squamous intraepithelial lesion/vulvar intraepithelial neoplasia grade 2-3 of     usual type (HSIL/uVIN 2-3) with adnexal extension, examined margins uninvolved  See Note  - No invasive carcinoma identified      - Scar and changes consistent with prior biopsy site  - 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

## 2023-06-02 NOTE — TELEPHONE ENCOUNTER
Please add her to Elena's schedule today in [facility]  Assuming that will be easier for her than Dawna Jesus  Thanks!

## 2023-06-02 NOTE — ASSESSMENT & PLAN NOTE
25-year-old with biopsy-proven KHLOE 3 who is s/p CO2 laser ablation and simple vulvectomy on 5/26/22  Pathology c/w HSIL grad 2-3, margins uninvolved  She presents today as her posterior left vulvectomy site has   On exam, this is a superficial separation measuring approximately 2 5 cm  Wound bed is shallow, with granulation tissue noted, and appears healthy  Laser ablation site just adjacent to the clitoris on the right has a skin defect measuring approximately 1cm  No drainage or stigmata of infection  Patient will return to the office as scheduled for her next postoperative visit  She will call the office in the interm  Infection precautions given   We discussed abstaining from strenuous exercise over the next 2 weeks as the wound continues to heal

## 2023-06-02 NOTE — PATIENT INSTRUCTIONS
Return to the office as scheduled for next postop visit  Call in the interim with any concerns  Report fever, new bleeding, or increased drainage from surgical site

## 2023-06-06 ENCOUNTER — TELEPHONE (OUTPATIENT)
Dept: HEMATOLOGY ONCOLOGY | Facility: CLINIC | Age: 48
End: 2023-06-06

## 2023-06-06 NOTE — TELEPHONE ENCOUNTER
Patient Call    Who are you speaking with? Patient    If it is not the patient, are they listed on an active communication consent form? N/A   What is the reason for this call? Patient calling regarding her post op appointment 06/26/23 with Dr Lavell Barlow    Patient would like to know if any accommodations can be made to have her seen the week of 06/19/23   Does this require a call back? Yes   If a call back is required, please list best call back number [phone]   If a call back is required, advise that a message will be forwarded to their care team and someone will return their call as soon as possible  Did you relay this information to the patient?  Yes

## 2023-06-26 ENCOUNTER — TELEPHONE (OUTPATIENT)
Dept: GYNECOLOGIC ONCOLOGY | Facility: CLINIC | Age: 48
End: 2023-06-26

## 2023-06-26 ENCOUNTER — TELEPHONE (OUTPATIENT)
Dept: HEMATOLOGY ONCOLOGY | Facility: CLINIC | Age: 48
End: 2023-06-26

## 2023-06-26 NOTE — TELEPHONE ENCOUNTER
Appointment Change  Cancel, Reschedule, Change to Virtual      Who are you speaking with? Patient   If it is not the patient, are they listed on an active communication consent form? N/A   Which provider is the appointment scheduled with? Dr Daina Alfredo   When is the appointment scheduled? Please list date and time 06/26/23 3PM   At which location is the appointment scheduled to take place? [facility]   Was the appointment rescheduled or changed from an in person visit to a virtual visit? If so, please list the details of the change  07/25/23 4PM   What is the reason for the appointment change? Patient cannot leave work   Was [facility] scheduled for this visit? No   Does [facility] need to be scheduled for the new visit (if applicable) N/A   Does the patient need an infusion appointment rescheduled? No   Does the patient have an infusion appointment scheduled? If so, when? No   Is the patient undergoing chemotherapy? No   Was the no-show policy reviewed for appointments being changed with less then 24 hours of notice?  N/A

## 2023-06-26 NOTE — TELEPHONE ENCOUNTER
Called and informed patient that per provider she needs to be seen sooner then the 25th of July  Patient was in agreement of the date, time and location of the appointment  Informed patient to call the office if she needs to change the appointment 
self feeding/dressing

## 2023-07-03 ENCOUNTER — OFFICE VISIT (OUTPATIENT)
Dept: GYNECOLOGIC ONCOLOGY | Facility: CLINIC | Age: 48
End: 2023-07-03

## 2023-07-03 VITALS
SYSTOLIC BLOOD PRESSURE: 120 MMHG | TEMPERATURE: 97.4 F | BODY MASS INDEX: 28.09 KG/M2 | HEART RATE: 80 BPM | HEIGHT: 67 IN | WEIGHT: 179 LBS | DIASTOLIC BLOOD PRESSURE: 72 MMHG | OXYGEN SATURATION: 98 %

## 2023-07-03 DIAGNOSIS — D07.1 VULVAR INTRAEPITHELIAL NEOPLASIA (VIN) GRADE 3: ICD-10-CM

## 2023-07-03 DIAGNOSIS — M79.10 MYALGIA: ICD-10-CM

## 2023-07-03 DIAGNOSIS — Z98.890 POSTOPERATIVE STATE: Primary | ICD-10-CM

## 2023-07-03 PROCEDURE — 99024 POSTOP FOLLOW-UP VISIT: CPT | Performed by: PHYSICIAN ASSISTANT

## 2023-07-03 RX ORDER — CELECOXIB 200 MG/1
200 CAPSULE ORAL DAILY
Qty: 90 CAPSULE | Refills: 0 | Status: SHIPPED | OUTPATIENT
Start: 2023-07-03

## 2023-07-03 NOTE — PROGRESS NOTES
Assessment/Plan:    Problem List Items Addressed This Visit        Genitourinary    Vulvar intraepithelial neoplasia (KHLOE) grade 1    46year-old with biopsy-proven VIN3 s/p CO2 laser ablation and simple vulvectomy on 5/26/23. Final pathology was consistent with VIN2/3 (negative margins). She has recovered well from surgery. Her wounds are healed. Return to the office in 6 months for vulvar colposcopy. Patient to call with new vulvar symptoms. Other    Postoperative state - Primary         CHIEF COMPLAINT:  Post-operative evaluation     Problem:  Biopsy proven VIN3    Previous therapy:  1. CO2 laser ablation and simple vulvectomy on 5/26/22. A. VIN2-3, negative margins    Patient ID: Zuleika Rollins is a 46 y.o. female  who presents to the office for continued post-operative care. She is without acute complaints. She has been afebrile. No vulvar pain, bleeding or discharge. Normal bowel/bladder function. The following portions of the patient's history were reviewed and updated as appropriate: allergies, current medications, past medical history, past surgical history and problem list.      Review of Systems   Constitutional: Negative. Respiratory: Negative. Cardiovascular: Negative. Gastrointestinal: Negative. Genitourinary: Negative. Skin: Negative. Current Outpatient Medications:   •  Bioflavonoid Products (VITAMIN C PLUS PO), Take by mouth, Disp: , Rfl:   •  Calcium 200 MG TABS, Take by mouth, Disp: , Rfl:   •  celecoxib (CeleBREX) 200 mg capsule, Take 1 capsule (200 mg total) by mouth daily. , Disp: 90 capsule, Rfl: 0  •  cholecalciferol (VITAMIN D3) 1,000 units tablet, Take 1 tablet (1,000 Units total) by mouth daily, Disp: , Rfl:   •  clobetasol (TEMOVATE) 0.05 % external solution, , Disp: , Rfl:   •  Carole 0.1 MG/24HR, , Disp: , Rfl:   •  metoprolol tartrate (LOPRESSOR) 25 mg tablet, , Disp: , Rfl:   •  Misc Natural Products (FIBER 7 PO), Take by mouth, Disp: , Rfl:   •  Probiotic Product (PROBIOTIC-10 PO), Take by mouth, Disp: , Rfl:   •  SELENIUM PO, Take by mouth, Disp: , Rfl:   •  Zinc Sulfate (ZINC 15 PO), Take by mouth, Disp: , Rfl:   •  Carole 0.075 MG/24HR, , Disp: , Rfl:   •  Ginkgo Biloba 100 MG CAPS, Take by mouth, Disp: , Rfl:     Current Facility-Administered Medications:   •  levalbuterol (XOPENEX) inhalation solution 1.25 mg, 1.25 mg, Nebulization, Q8H PRN, Rita Salazar MD, 1.25 mg at 02/03/18 0909    Objective:    Blood pressure 120/72, pulse 80, temperature (!) 97.4 °F (36.3 °C), temperature source Temporal, height 5' 6.75" (1.695 m), weight 81.2 kg (179 lb), SpO2 98 %. Body mass index is 28.25 kg/m². Body surface area is 1.92 meters squared. Physical Exam  Constitutional:       Appearance: Normal appearance. HENT:      Head: Normocephalic and atraumatic. Pulmonary:      Effort: Pulmonary effort is normal.   Genitourinary:     Comments: External female genitalia examined. Post-surgical changes. Incisions well healed. Skin:     General: Skin is warm and dry. Neurological:      General: No focal deficit present. Mental Status: She is alert and oriented to person, place, and time. Psychiatric:         Mood and Affect: Mood normal.         Behavior: Behavior normal.         Thought Content:  Thought content normal.

## 2023-07-05 PROBLEM — Z98.890 POSTOPERATIVE STATE: Status: ACTIVE | Noted: 2023-07-05

## 2023-07-05 NOTE — ASSESSMENT & PLAN NOTE
13-year-old with biopsy-proven VIN3 s/p CO2 laser ablation and simple vulvectomy on 5/26/23. Final pathology was consistent with VIN2/3 (negative margins). She has recovered well from surgery. Her wounds are healed. Return to the office in 6 months for vulvar colposcopy. Patient to call with new vulvar symptoms.

## 2023-09-20 DIAGNOSIS — M79.10 MYALGIA: ICD-10-CM

## 2023-09-20 RX ORDER — CELECOXIB 200 MG/1
200 CAPSULE ORAL DAILY
Qty: 90 CAPSULE | Refills: 0 | Status: SHIPPED | OUTPATIENT
Start: 2023-09-20

## 2023-10-11 ENCOUNTER — VBI (OUTPATIENT)
Dept: FAMILY MEDICINE CLINIC | Facility: CLINIC | Age: 48
End: 2023-10-11

## 2023-10-11 NOTE — LETTER
[facility]  [facility] [id number] [phone]    Date: 10/11/23  Jennifer Mir  [address] [place] [address]    Dear Niki Arambula: We have recently learned that you have received care in an Emergency Room. Your primary care provider wants to make sure that your ongoing medical care is being addressed. If you require follow up care as a result of your emergency department visit, there are a few things the practice would like you to know. As part of the network's continuing commitment to caring for our patients, we have added more same day appointments and have extended office hours to meet your medical needs. After hours, on-call physicians are available via your primary care provider's main office line. We encourage you to contact the office prior to seeking treatment to discuss your symptoms with the medical staff. Together, we can determine the correct course of action. A majority of non-emergent conditions such as: common cold, flu-like symptoms, fevers, strains/sprains, dislocations, minor burns, cuts and animal bites can be treated at [facility]. Diagnostic testing is available at some sites. Of course, if you are experiencing a life threatening medical emergency call 911 or proceed directly to the nearest emergency room.     Your nearest [facility] facility is conveniently located at:    [facility]  [address] [facility], [address] [place] [address]  [phone]  [address] offered at [address] your spot online at [email or web address] or on the [address]    Sincerely,    [address] [facility]  Dept: [phone]

## 2023-10-11 NOTE — TELEPHONE ENCOUNTER
10/11/23 2:33 PM    Patient contacted post ED visit, phone outreaches were unsuccessful and a MyChart letter has been sent to the patient as follow-up. Thank you.   Eunice Lua  [facility]

## 2023-10-12 ENCOUNTER — APPOINTMENT (OUTPATIENT)
Dept: LAB | Facility: CLINIC | Age: 48
End: 2023-10-12
Payer: COMMERCIAL

## 2023-10-12 DIAGNOSIS — E55.9 VITAMIN D DEFICIENCY: ICD-10-CM

## 2023-10-12 DIAGNOSIS — L65.9 NONSCARRING HAIR LOSS: ICD-10-CM

## 2023-10-12 DIAGNOSIS — E53.8 B12 DEFICIENCY: ICD-10-CM

## 2023-10-12 DIAGNOSIS — M79.10 MYALGIA: ICD-10-CM

## 2023-10-12 DIAGNOSIS — L57.0 ACTINIC KERATOSIS: ICD-10-CM

## 2023-10-12 DIAGNOSIS — L65.0 TELOGEN EFFLUVIUM: ICD-10-CM

## 2023-10-12 DIAGNOSIS — E05.90 HYPERTHYROIDISM: ICD-10-CM

## 2023-10-12 DIAGNOSIS — L57.8 OTHER SKIN CHANGES DUE TO CHRONIC EXPOSURE TO NONIONIZING RADIATION: Primary | ICD-10-CM

## 2023-10-12 DIAGNOSIS — E78.2 MIXED HYPERLIPIDEMIA: ICD-10-CM

## 2023-10-12 DIAGNOSIS — D22.71: ICD-10-CM

## 2023-10-12 DIAGNOSIS — D22.72 MELANOCYTIC NEVI OF LOWER LIMB, INCLUDING HIP, LEFT: ICD-10-CM

## 2023-10-12 LAB
25(OH)D3 SERPL-MCNC: 45.1 NG/ML (ref 30–100)
ALBUMIN SERPL BCP-MCNC: 4.3 G/DL (ref 3.5–5)
ALP SERPL-CCNC: 56 U/L (ref 34–104)
ALT SERPL W P-5'-P-CCNC: 22 U/L (ref 7–52)
ANA SER QL IA: NEGATIVE
ANION GAP SERPL CALCULATED.3IONS-SCNC: 10 MMOL/L
AST SERPL W P-5'-P-CCNC: 26 U/L (ref 13–39)
B BURGDOR IGG+IGM SER QL IA: NEGATIVE
BASOPHILS # BLD AUTO: 0.03 THOUSANDS/ÂΜL (ref 0–0.1)
BASOPHILS NFR BLD AUTO: 1 % (ref 0–1)
BILIRUB SERPL-MCNC: 0.54 MG/DL (ref 0.2–1)
BUN SERPL-MCNC: 17 MG/DL (ref 5–25)
CALCIUM SERPL-MCNC: 9.4 MG/DL (ref 8.4–10.2)
CHLORIDE SERPL-SCNC: 105 MMOL/L (ref 96–108)
CHOLEST SERPL-MCNC: 174 MG/DL
CK SERPL-CCNC: 49 U/L (ref 26–192)
CO2 SERPL-SCNC: 24 MMOL/L (ref 21–32)
CREAT SERPL-MCNC: 0.67 MG/DL (ref 0.6–1.3)
CRP SERPL QL: 2.6 MG/L
EOSINOPHIL # BLD AUTO: 0.67 THOUSAND/ÂΜL (ref 0–0.61)
EOSINOPHIL NFR BLD AUTO: 11 % (ref 0–6)
ERYTHROCYTE [DISTWIDTH] IN BLOOD BY AUTOMATED COUNT: 11.3 % (ref 11.6–15.1)
ERYTHROCYTE [SEDIMENTATION RATE] IN BLOOD: 7 MM/HOUR (ref 0–29)
FERRITIN SERPL-MCNC: 94 NG/ML (ref 11–307)
GFR SERPL CREATININE-BSD FRML MDRD: 102 ML/MIN/1.73SQ M
GLUCOSE P FAST SERPL-MCNC: 91 MG/DL (ref 65–99)
HCT VFR BLD AUTO: 41.8 % (ref 34.8–46.1)
HDLC SERPL-MCNC: 66 MG/DL
HGB BLD-MCNC: 14 G/DL (ref 11.5–15.4)
IMM GRANULOCYTES # BLD AUTO: 0.02 THOUSAND/UL (ref 0–0.2)
IMM GRANULOCYTES NFR BLD AUTO: 0 % (ref 0–2)
IRON SATN MFR SERPL: 56 % (ref 15–50)
IRON SERPL-MCNC: 185 UG/DL (ref 50–212)
LDLC SERPL CALC-MCNC: 92 MG/DL (ref 0–100)
LYMPHOCYTES # BLD AUTO: 1.73 THOUSANDS/ÂΜL (ref 0.6–4.47)
LYMPHOCYTES NFR BLD AUTO: 29 % (ref 14–44)
MCH RBC QN AUTO: 32.3 PG (ref 26.8–34.3)
MCHC RBC AUTO-ENTMCNC: 33.5 G/DL (ref 31.4–37.4)
MCV RBC AUTO: 96 FL (ref 82–98)
MONOCYTES # BLD AUTO: 0.77 THOUSAND/ÂΜL (ref 0.17–1.22)
MONOCYTES NFR BLD AUTO: 13 % (ref 4–12)
NEUTROPHILS # BLD AUTO: 2.84 THOUSANDS/ÂΜL (ref 1.85–7.62)
NEUTS SEG NFR BLD AUTO: 46 % (ref 43–75)
NONHDLC SERPL-MCNC: 108 MG/DL
NRBC BLD AUTO-RTO: 0 /100 WBCS
PLATELET # BLD AUTO: 235 THOUSANDS/UL (ref 149–390)
PMV BLD AUTO: 11.1 FL (ref 8.9–12.7)
POTASSIUM SERPL-SCNC: 3.9 MMOL/L (ref 3.5–5.3)
PROT SERPL-MCNC: 6.9 G/DL (ref 6.4–8.4)
RBC # BLD AUTO: 4.34 MILLION/UL (ref 3.81–5.12)
RHEUMATOID FACT SER QL LA: NEGATIVE
SODIUM SERPL-SCNC: 139 MMOL/L (ref 135–147)
T4 FREE SERPL-MCNC: 0.74 NG/DL (ref 0.61–1.12)
TIBC SERPL-MCNC: 333 UG/DL (ref 250–450)
TRIGL SERPL-MCNC: 79 MG/DL
TSH SERPL DL<=0.05 MIU/L-ACNC: 0.35 UIU/ML (ref 0.45–4.5)
UIBC SERPL-MCNC: 148 UG/DL (ref 155–355)
VIT B12 SERPL-MCNC: 255 PG/ML (ref 180–914)
WBC # BLD AUTO: 6.06 THOUSAND/UL (ref 4.31–10.16)

## 2023-10-12 PROCEDURE — 85025 COMPLETE CBC W/AUTO DIFF WBC: CPT

## 2023-10-12 PROCEDURE — 84439 ASSAY OF FREE THYROXINE: CPT

## 2023-10-12 PROCEDURE — 85652 RBC SED RATE AUTOMATED: CPT

## 2023-10-12 PROCEDURE — 80061 LIPID PANEL: CPT

## 2023-10-12 PROCEDURE — 86618 LYME DISEASE ANTIBODY: CPT

## 2023-10-12 PROCEDURE — 80053 COMPREHEN METABOLIC PANEL: CPT

## 2023-10-12 PROCEDURE — 36415 COLL VENOUS BLD VENIPUNCTURE: CPT

## 2023-10-12 PROCEDURE — 82728 ASSAY OF FERRITIN: CPT

## 2023-10-12 PROCEDURE — 82607 VITAMIN B-12: CPT

## 2023-10-12 PROCEDURE — 86430 RHEUMATOID FACTOR TEST QUAL: CPT

## 2023-10-12 PROCEDURE — 84443 ASSAY THYROID STIM HORMONE: CPT

## 2023-10-12 PROCEDURE — 82550 ASSAY OF CK (CPK): CPT

## 2023-10-12 PROCEDURE — 86038 ANTINUCLEAR ANTIBODIES: CPT

## 2023-10-12 PROCEDURE — 83540 ASSAY OF IRON: CPT

## 2023-10-12 PROCEDURE — 82652 VIT D 1 25-DIHYDROXY: CPT

## 2023-10-12 PROCEDURE — 82306 VITAMIN D 25 HYDROXY: CPT

## 2023-10-12 PROCEDURE — 86140 C-REACTIVE PROTEIN: CPT

## 2023-10-12 PROCEDURE — 83550 IRON BINDING TEST: CPT

## 2023-10-14 LAB — 1,25(OH)2D3 SERPL-MCNC: 30.9 PG/ML (ref 24.8–81.5)

## 2023-10-24 ENCOUNTER — OFFICE VISIT (OUTPATIENT)
Dept: FAMILY MEDICINE CLINIC | Facility: CLINIC | Age: 48
End: 2023-10-24
Payer: COMMERCIAL

## 2023-10-24 VITALS
DIASTOLIC BLOOD PRESSURE: 80 MMHG | TEMPERATURE: 98.8 F | OXYGEN SATURATION: 98 % | HEIGHT: 66 IN | WEIGHT: 181.6 LBS | HEART RATE: 89 BPM | SYSTOLIC BLOOD PRESSURE: 122 MMHG | BODY MASS INDEX: 29.18 KG/M2

## 2023-10-24 DIAGNOSIS — R00.2 PALPITATIONS: ICD-10-CM

## 2023-10-24 DIAGNOSIS — E78.2 MIXED HYPERLIPIDEMIA: Primary | ICD-10-CM

## 2023-10-24 DIAGNOSIS — M79.10 MYALGIA: ICD-10-CM

## 2023-10-24 DIAGNOSIS — E53.8 B12 DEFICIENCY: ICD-10-CM

## 2023-10-24 DIAGNOSIS — K58.9 IRRITABLE BOWEL SYNDROME, UNSPECIFIED TYPE: ICD-10-CM

## 2023-10-24 DIAGNOSIS — E05.90 HYPERTHYROIDISM: ICD-10-CM

## 2023-10-24 DIAGNOSIS — E55.9 VITAMIN D DEFICIENCY: ICD-10-CM

## 2023-10-24 DIAGNOSIS — F17.200 TOBACCO USE DISORDER: ICD-10-CM

## 2023-10-24 PROBLEM — U07.1 COVID-19 VIRUS INFECTION: Status: RESOLVED | Noted: 2023-03-29 | Resolved: 2023-10-24

## 2023-10-24 PROCEDURE — 99214 OFFICE O/P EST MOD 30 MIN: CPT | Performed by: FAMILY MEDICINE

## 2023-10-24 NOTE — ASSESSMENT & PLAN NOTE
Cholesterol is again fantastic. Check in approximately 1 year. Not on medications. Given that she has not had cardiac event or equivalent, no treatment needed at this time.

## 2023-10-24 NOTE — PATIENT INSTRUCTIONS
1. Mixed hyperlipidemia  Assessment & Plan:  Cholesterol is again fantastic. Check in approximately 1 year. Not on medications. Given that she has not had cardiac event or equivalent, no treatment needed at this time. Orders:  -     Comprehensive metabolic panel; Future; Expected date: 10/24/2024  -     Lipid Panel with Direct LDL reflex; Future; Expected date: 10/24/2024    2. Hyperthyroidism  Assessment & Plan:  Not on medications. TSH was normal for endocrinology. T4 was also normal.  No treatment needed. Check in approximately a year. Orders:  -     TSH, 3rd generation; Future; Expected date: 10/24/2024    3. B12 deficiency  Assessment & Plan:  B12 is currently not deficient. Not on B supplementation. She should continue her current activity. Orders:  -     CBC and differential; Future; Expected date: 10/24/2024    4. Irritable bowel syndrome, unspecified type  Assessment & Plan:  Patient feels she is doing quite well as far as this is concerned. Continue lifestyle modifications. Briefly discussed medication options, which are symptomatic only and do not seem to fix anything with IBS. 5. Palpitations  Assessment & Plan:  Patient is currently on metoprolol. No concerns. Reviewed about hypotension and beta-blockers. Patient is following with cardiology. 6. Tobacco use disorder  Assessment & Plan:  Vaping every day. Recommend quit. 7. Vitamin D deficiency  Assessment & Plan:  Patient's vitamin D level was excellent. No change. Orders:  -     Vitamin D 25 hydroxy; Future; Expected date: 10/24/2024    8. Myalgia  Assessment & Plan:  Normal labs. Has some aches if not taking Celebrex. COVID 19 Instructions    Ezequiel Nam was advised to limit contact with others to essential tasks such as getting food, medications, and medical care. Proper handwashing reviewed, and Hand sanitzer when washing is not available.     If the patient develops symptoms of COVID 19, the patient should call the office as soon as possible. It is strongly recommended that Flu Vaccinations be obtained. Virtual Visits:  Asiya: This works on smart phones (any phone with Internet browsing capability). You should get a text message when the provider is ready to see you. Click on the link in the text message, and the call should start. You will need to type in your name, and allow camera and microphone access. This is HIPPA compliant, and secure. If you have not already done so, get immunized to COVID 19. We are committed to getting you vaccinated as soon as possible and will be closely following CDC and [facility] guidelines as they are released and revised. Please refer to our COVID-19 vaccine webpage for the most up to date information on the vaccine and our distribution efforts. This site will also have the most up to date recommendations for COVID booster vaccine. [email or web address]    Call [phone] ([phone]), option 7    You can also visit [facility]. to find vaccines in your area. OUR LOCATION:    [facility]  [address] [facility], [address] [place], [place], [address]  Fax: [phone]    Lab services, Rheumatology, and OB/GYN are at this location as well.

## 2023-10-24 NOTE — ASSESSMENT & PLAN NOTE
Patient is currently on metoprolol. No concerns. Reviewed about hypotension and beta-blockers. Patient is following with cardiology.

## 2023-10-24 NOTE — PROGRESS NOTES
Name: Nabila Carmichael      : 1975      MRN: [id number]  Encounter Provider: Mary Ely MD  Encounter Date: 10/24/2023   Encounter department: [facility]    Assessment & Plan     1. Mixed hyperlipidemia  Assessment & Plan:  Cholesterol is again fantastic. Check in approximately 1 year. Not on medications. Given that she has not had cardiac event or equivalent, no treatment needed at this time. Orders:  -     Comprehensive metabolic panel; Future; Expected date: 10/24/2024  -     Lipid Panel with Direct LDL reflex; Future; Expected date: 10/24/2024    2. Hyperthyroidism  Assessment & Plan:  Not on medications. TSH was normal for endocrinology. T4 was also normal.  No treatment needed. Check in approximately a year. Orders:  -     TSH, 3rd generation; Future; Expected date: 10/24/2024    3. B12 deficiency  Assessment & Plan:  B12 is currently not deficient. Not on B supplementation. She should continue her current activity. Orders:  -     CBC and differential; Future; Expected date: 10/24/2024    4. Irritable bowel syndrome, unspecified type  Assessment & Plan:  Patient feels she is doing quite well as far as this is concerned. Continue lifestyle modifications. Briefly discussed medication options, which are symptomatic only and do not seem to fix anything with IBS. 5. Palpitations  Assessment & Plan:  Patient is currently on metoprolol. No concerns. Reviewed about hypotension and beta-blockers. Patient is following with cardiology. 6. Tobacco use disorder  Assessment & Plan:  Vaping every day. Recommend quit. 7. Vitamin D deficiency  Assessment & Plan:  Patient's vitamin D level was excellent. No change. Orders:  -     Vitamin D 25 hydroxy; Future; Expected date: 10/24/2024    8. Myalgia  Assessment & Plan:  Normal labs. Has some aches if not taking Celebrex.         Depression Screening and Follow-up Plan: Patient was screened for depression during today's encounter. They screened negative with a PHQ-2 score of 0. Subjective      Chief Complaint   Patient presents with   • Follow-up     Follow up- blood work       HPI  Review of Systems    Current Outpatient Medications on File Prior to Visit   Medication Sig   • Bioflavonoid Products (VITAMIN C PLUS PO) Take by mouth   • Calcium 200 MG TABS Take by mouth   • celecoxib (CeleBREX) 200 mg capsule Take 1 capsule by mouth daily   • cholecalciferol (VITAMIN D3) 1,000 units tablet Take 1 tablet (1,000 Units total) by mouth daily   • clobetasol (TEMOVATE) 0.05 % external solution    • Carole 0.1 MG/24HR    • metoprolol tartrate (LOPRESSOR) 25 mg tablet    • Probiotic Product (PROBIOTIC-10 PO) Take by mouth   • SELENIUM PO Take by mouth   • Zinc Sulfate (ZINC 15 PO) Take by mouth   • [DISCONTINUED] Carole 0.075 MG/24HR  (Patient not taking: Reported on 6/2/2023)   • [DISCONTINUED] Ginkgo Biloba 100 MG CAPS Take by mouth   • [DISCONTINUED] Misc Natural Products (FIBER 7 PO) Take by mouth       Objective     Vitamin 1-25 D 30.9. Vitamin D, 25-hydroxy: 45.1. Iron saturation 56. Iron level 185. TIBC 333. TSH 0.351. Free T4: 0.74. Sodium 139, potassium 3.9, calcium 9.4. , creatinine 0.67. AST 26, ALT 22. Blood sugar 91. Total cholesterol 174, LDL 92, HDL 66, triglycerides 79. White count 6.06, hemoglobin 14.0, hematocrit 41.8, platelets 149. B12 255, normal.  Lyme screen negative. HEATHER negative. Rheumatoid factor negative. CPK 49, normal.  CRP: 2.6, normal.  Sed rate 7.    /80 (BP Location: Left arm, Patient Position: Sitting, Cuff Size: Adult)   Pulse 89   Temp 98.8 °F (37.1 °C) (Tympanic)   Ht 5' 6" (1.676 m)   Wt 82.4 kg (181 lb 9.6 oz)   SpO2 98%   BMI 29.31 kg/m²     Physical Exam  Vitals and nursing note reviewed. Constitutional:       Appearance: She is well-developed. HENT:      Head: Normocephalic and atraumatic.    Cardiovascular:      Rate and Rhythm: Normal rate and regular rhythm. Pulses:           Carotid pulses are 2+ on the right side and 2+ on the left side. Heart sounds: Normal heart sounds. Pulmonary:      Effort: Pulmonary effort is normal.      Breath sounds: Normal breath sounds. No wheezing or rales. Chest:      Chest wall: No tenderness.        Aris Cid MD

## 2023-10-24 NOTE — ASSESSMENT & PLAN NOTE
Patient feels she is doing quite well as far as this is concerned. Continue lifestyle modifications. Briefly discussed medication options, which are symptomatic only and do not seem to fix anything with IBS.

## 2023-10-24 NOTE — ASSESSMENT & PLAN NOTE
Not on medications. TSH was normal for endocrinology. T4 was also normal.  No treatment needed. Check in approximately a year.

## 2023-11-29 ENCOUNTER — TELEPHONE (OUTPATIENT)
Dept: HEMATOLOGY ONCOLOGY | Facility: CLINIC | Age: 48
End: 2023-11-29

## 2023-11-29 NOTE — TELEPHONE ENCOUNTER
I called Jennifer Handler regarding an appointment that they have scheduled with Dr. Brian Car scheduled on  01/08/2024      I left a voicemail explaining to patient that this appointment will need to be rescheduled due to a change in the providers schedule. Patient was advised to call [facility] to reschedule.   Another attempt will be made to reschedule

## 2023-12-05 ENCOUNTER — TELEPHONE (OUTPATIENT)
Dept: HEMATOLOGY ONCOLOGY | Facility: CLINIC | Age: 48
End: 2023-12-05

## 2023-12-05 NOTE — TELEPHONE ENCOUNTER
Appointment Change  Cancel, Reschedule, Change to Virtual      Who are you speaking with? Patient   If it is not the patient, is the caller listed on the communication consent form? N/A   Which provider is the appointment scheduled with? Dr. Fernandez Lights   When was the original appointment scheduled? Please list date and time 01/08/24 3PM   At which location is the appointment scheduled to take place? [facility]   Was the appointment rescheduled? Was the appointment changed from an in person visit to a virtual visit? If so, please list the details of the change. 01/09/24 3:30PM   What is the reason for the appointment change?  Provider

## 2023-12-13 DIAGNOSIS — M79.10 MYALGIA: ICD-10-CM

## 2023-12-13 RX ORDER — CELECOXIB 200 MG/1
200 CAPSULE ORAL DAILY
Qty: 90 CAPSULE | Refills: 0 | Status: SHIPPED | OUTPATIENT
Start: 2023-12-13

## 2024-01-09 ENCOUNTER — TELEMEDICINE (OUTPATIENT)
Dept: FAMILY MEDICINE CLINIC | Facility: CLINIC | Age: 49
End: 2024-01-09
Payer: COMMERCIAL

## 2024-01-09 ENCOUNTER — TELEPHONE (OUTPATIENT)
Dept: HEMATOLOGY ONCOLOGY | Facility: CLINIC | Age: 49
End: 2024-01-09

## 2024-01-09 DIAGNOSIS — Z28.310 UNVACCINATED FOR COVID-19: ICD-10-CM

## 2024-01-09 DIAGNOSIS — U07.1 COVID-19 VIRUS INFECTION: Primary | ICD-10-CM

## 2024-01-09 PROCEDURE — 99213 OFFICE O/P EST LOW 20 MIN: CPT | Performed by: FAMILY MEDICINE

## 2024-01-09 RX ORDER — NIRMATRELVIR AND RITONAVIR 300-100 MG
3 KIT ORAL 2 TIMES DAILY
Qty: 30 TABLET | Refills: 0 | Status: SHIPPED | OUTPATIENT
Start: 2024-01-09 | End: 2024-01-14

## 2024-01-09 NOTE — TELEPHONE ENCOUNTER
Appointment Change  Cancel, Reschedule, Change to Virtual      Who are you speaking with? Patient   If it is not the patient, is the caller listed on the communication consent form? Yes   Which provider is the appointment scheduled with? Dr. Shaw   When was the original appointment scheduled?    Please list date and time 1/9/24   At which location is the appointment scheduled to take place? [place]   Was the appointment rescheduled?     Was the appointment changed from an in person visit to a virtual visit?    If so, please list the details of the change. 1/19/24   What is the reason for the appointment change? Covid

## 2024-01-09 NOTE — ASSESSMENT & PLAN NOTE
Given the patient is unvaccinated for COVID, she is at increased risk for significant complications.  Recommend treatment with Paxlovid.

## 2024-01-09 NOTE — PROGRESS NOTES
COVID-19 Outpatient Progress Note    Assessment/Plan:    Problem List Items Addressed This Visit     COVID-19 virus infection - Primary     Positive COVID-19 test at home.  Positive symptoms.  Mild to moderate, though seems to be getting somewhat worse.  Given her unvaccinated status, treat with Paxlovid to try and reduce the chances of her admission to the hospital.         Relevant Medications    nirmatrelvir & ritonavir (Paxlovid, 300/100,) tablet therapy pack    Unvaccinated for covid-19     Given the patient is unvaccinated for COVID, she is at increased risk for significant complications.  Recommend treatment with Paxlovid.           Disposition:     Patient with asymptomatic/mild COVID-19: They were recommended to isolate for at least 5 days (day 0 is the day symptoms appeared or the date the specimen was collected for the positive test for people who are asymptomatic). If they are asymptomatic or symptoms are improving with no fevers in the past 24 hours, isolation may be ended followed by 5 days of wearing a high quality mask when around others to minimize risk of infecting others. They should wear a mask through day 10 and a test-based strategy may be used to remove a mask sooner.      Discussed symptom directed medication options with patient. Discussed vitamin D, vitamin C, and/or zinc supplementation with patient.     Patient meets criteria for Paxlovid and they have been counseled appropriately regarding risks, benefits, side effects, and alternative treatment options. After discussion, patient agrees to treatment.    Possible side effects of Paxlovid?    Possible side effects of Paxlovid are:  - Liver Problems. Notify us right away if you start to experience loss of appetite, yellowing of your skin and the whites of eyes (jaundice), dark-colored urine, pale colored stools and itchy skin, stomach area (abdominal) pain.  - Resistance to HIV Medicines. If you have untreated HIV infection, Paxlovid may  lead to some HIV medicines not working as well in the future.  - Other possible side effects include: altered sense of taste, diarrhea, high blood pressure, or muscle aches.    I have spent a total time of 20 minutes on the day of the encounter for this patient including discussing diagnostic results, discussing prognosis, risks and benefits of treatment options, instructions for management, patient and family education, importance of treatment compliance, risk factor reductions, impressions, counseling/coordination of care, documenting in the medical record, reviewing/ordering tests, medicine, procedures and obtaining or reviewing history.       Encounter provider: Brian Mena MD     Provider located at: [facility]  [address] [address] [phone]     Recent Visits  No visits were found meeting these conditions.  Showing recent visits within past 7 days and meeting all other requirements  Today's Visits  Date Type Provider Dept   01/09/24 Telemedicine Brian Mena MD [facility]   Showing today's visits and meeting all other requirements  Future Appointments  No visits were found meeting these conditions.  Showing future appointments within next 150 days and meeting all other requirements     This virtual check-in was done via [place] and patient was informed that this is a secure, HIPAA-compliant platform. She agrees to proceed.    Patient agrees to participate in a virtual check in via telephone or video visit instead of presenting to the office to address urgent/immediate medical needs. Patient is aware this is a billable service. She acknowledged consent and understanding of privacy and security of the video platform. The patient has agreed to participate and understands they can discontinue the visit at any time.    After connecting through [employer], the patient was identified by name and date of birth.  "Rhonda Marx was informed that this was a telemedicine visit and that the exam was being conducted confidentially over secure lines. My office door was closed. No one else was in the room. Rhonda Marx acknowledged consent and understanding of privacy and security of the telemedicine visit. I informed the patient that I have reviewed her record in Epic and presented the opportunity for her to ask any questions regarding the visit today. The patient agreed to participate.     Verification of patient location:  Patient is located in the following state in which I hold an active license: [place]    Subjective:   Rhonda Marx is a 48 y.o. female who has been screened for COVID-19. Symptom change since last report: worsening. Patient's symptoms include fever, fatigue, malaise, nasal congestion, rhinorrhea, sore throat, cough, vomiting, diarrhea and myalgias. Patient denies chills, anosmia, loss of taste, shortness of breath, chest tightness, abdominal pain, nausea and headaches.     - Date of symptom onset: 1/7/2024  - Date of positive COVID-19 test: 1/9/2024. Type of test: Home antigen. Patient with typical symptoms of COVID-19 and they attest that they were positive on home rapid antigen testing. Image of positive result is not able to be uploaded into their chart.     COVID-19 vaccination status: Not vaccinated    Rhonda has been staying home and has isolated themselves in her home. She is taking care to not share personal items and is cleaning all surfaces that are touched often, like counters, tabletops, and doorknobs using household cleaning sprays or wipes. She is wearing a mask when she leaves her room.     Ear pain as well.      No results found for: \"SARSCOV2\", \"SVIKUWH7BLC\", \"SARSCORONAVI\", \"CORONAVIRUSR\", \"SARSCOVAG\", \"SARSCOVAGH\"    Review of Systems   Constitutional:  Positive for fatigue and fever. Negative for chills.   HENT:  Positive for congestion, rhinorrhea and sore throat.    Respiratory:  Positive " for cough. Negative for chest tightness and shortness of breath.    Gastrointestinal:  Positive for diarrhea and vomiting. Negative for abdominal pain and nausea.   Musculoskeletal:  Positive for myalgias.   Neurological:  Negative for headaches.     Current Outpatient Medications on File Prior to Visit   Medication Sig   • Bioflavonoid Products (VITAMIN C PLUS PO) Take by mouth   • Calcium 200 MG TABS Take by mouth   • celecoxib (CeleBREX) 200 mg capsule TAKE ONE CAPSULE BY MOUTH EVERY DAY   • cholecalciferol (VITAMIN D3) 1,000 units tablet Take 1 tablet (1,000 Units total) by mouth daily   • clobetasol (TEMOVATE) 0.05 % external solution    • Carole 0.1 MG/24HR    • metoprolol tartrate (LOPRESSOR) 25 mg tablet    • Probiotic Product (PROBIOTIC-10 PO) Take by mouth   • SELENIUM PO Take by mouth   • Zinc Sulfate (ZINC 15 PO) Take by mouth       Objective:    There were no vitals taken for this visit.       Physical Exam  Brian Mena MD

## 2024-01-09 NOTE — PATIENT INSTRUCTIONS
1. COVID-19 virus infection  Assessment & Plan:  Positive COVID-19 test at home.  Positive symptoms.  Mild to moderate, though seems to be getting somewhat worse.  Given her unvaccinated status, treat with Paxlovid to try and reduce the chances of her admission to the hospital.    Orders:  -     nirmatrelvir & ritonavir (Paxlovid, 300/100,) tablet therapy pack; Take 3 tablets by mouth 2 (two) times a day for 5 days Take 2 nirmatrelvir tablets + 1 ritonavir tablet together per dose    2. Unvaccinated for covid-19  Assessment & Plan:  Given the patient is unvaccinated for COVID, she is at increased risk for significant complications.  Recommend treatment with Paxlovid.          COVID 19 Instructions    Rhonda Marx was advised to limit contact with others to essential tasks such as getting food, medications, and medical care.    Proper handwashing reviewed, and Hand sanitzer when washing is not available.    If the patient develops symptoms of COVID 19, the patient should call the office as soon as possible.    It is strongly recommended that Flu Vaccinations be obtained.      Virtual Visits:  Asiya: This works on smart phones (any phone with Internet browsing capability).  You should get a text message when the provider is ready to see you.  Click on the link in the text message, and the call should start.  You will need to type in your name, and allow camera and microphone access.  This is HIPPA compliant, and secure.      If you have not already done so, get immunized to COVID 19.      We are committed to getting you vaccinated as soon as possible and will be closely following CDC and [facility] guidelines as they are released and revised.  Please refer to our COVID-19 vaccine webpage for the most up to date information on the vaccine and our distribution efforts.    This site will also have the most up to date recommendations for COVID booster  vaccine.    https://[email or web address].org/covid-19/protect-yourself/covid-19-vaccine    Call [phone]-YMCFJWH ([phone]), option 7    You can also visit https://www.vaccines.gov/ to find vaccines in your area.    OUR LOCATION:    [facility]  [address] [place] [address], [address]  [place], [place], [address] [phone]  Fax: [phone]    Lab services, Rheumatology, and OB/GYN are at this location as well.

## 2024-01-09 NOTE — ASSESSMENT & PLAN NOTE
Positive COVID-19 test at home.  Positive symptoms.  Mild to moderate, though seems to be getting somewhat worse.  Given her unvaccinated status, treat with Paxlovid to try and reduce the chances of her admission to the hospital.

## 2024-01-15 ENCOUNTER — TELEPHONE (OUTPATIENT)
Age: 49
End: 2024-01-15

## 2024-01-15 ENCOUNTER — TELEMEDICINE (OUTPATIENT)
Dept: FAMILY MEDICINE CLINIC | Facility: CLINIC | Age: 49
End: 2024-01-15
Payer: COMMERCIAL

## 2024-01-15 DIAGNOSIS — U07.1 COVID-19 VIRUS INFECTION: Primary | ICD-10-CM

## 2024-01-15 PROCEDURE — 99214 OFFICE O/P EST MOD 30 MIN: CPT | Performed by: PHYSICIAN ASSISTANT

## 2024-01-15 RX ORDER — DEXTROMETHORPHAN HYDROBROMIDE AND PROMETHAZINE HYDROCHLORIDE 15; 6.25 MG/5ML; MG/5ML
5 SYRUP ORAL 4 TIMES DAILY PRN
Qty: 118 ML | Refills: 1 | Status: SHIPPED | OUTPATIENT
Start: 2024-01-15

## 2024-01-15 RX ORDER — PREDNISONE 10 MG/1
TABLET ORAL
Qty: 25 TABLET | Refills: 0 | Status: SHIPPED | OUTPATIENT
Start: 2024-01-15

## 2024-01-15 NOTE — TELEPHONE ENCOUNTER
Pt called stated she was treated with Paxlovid, still feeling very sick and no changes, Pt wanted a virtual visit, tried calling office to schedule. Please if you can change to virtual visit, Please contact patients if any concerns. [phone]. Thank you.

## 2024-01-15 NOTE — PROGRESS NOTES
COVID-19 Outpatient Progress Note    Assessment/Plan:    Problem List Items Addressed This Visit        Other    COVID-19 virus infection - Primary     Day 8 of symptoms. S/P Paxlovid. Pt. was supposed to go back to work tomorrow but not feeling well. Still staying in her room. This is the 3rd time she has had covid and the worst per her report. Prednisone taper as directed and phenergan DM as needed. Note to RTW Wed. Continue fluids, deep breaths, vitamins and side sleeping.          Relevant Medications    predniSONE 10 mg tablet    promethazine-dextromethorphan (PHENERGAN-DM) 6.25-15 mg/5 mL oral syrup        Disposition:     Patient with asymptomatic/mild COVID-19: They were recommended to isolate for at least 5 days (day 0 is the day symptoms appeared or the date the specimen was collected for the positive test for people who are asymptomatic). If they are asymptomatic or symptoms are improving with no fevers in the past 24 hours, isolation may be ended followed by 5 days of wearing a high quality mask when around others to minimize risk of infecting others. They should wear a mask through day 10 and a test-based strategy may be used to remove a mask sooner.      I have spent a total time of 15 minutes on the day of the encounter for this patient including risk factor reductions, impressions, documenting in the medical record, reviewing/ordering tests, medicine, procedures and obtaining or reviewing history.       Encounter provider: Suzette Palumbo PA-C     Provider located at: [address] [address] [phone]     Recent Visits  Date Type Provider Dept   01/09/24 Telemedicine Brian Mena MD [facility]   Showing recent visits within past 7 days and meeting all other requirements  Today's Visits  Date Type Provider Dept   01/15/24 Telemedicine Suzette Palumbo PA-C [facility]   Showing today's visits  and meeting all other requirements  Future Appointments  No visits were found meeting these conditions.  Showing future appointments within next 150 days and meeting all other requirements     This virtual check-in was done via [employer] Embedded and patient was informed that this is a secure, HIPAA-compliant platform. She agrees to proceed.    Patient agrees to participate in a virtual check in via telephone or video visit instead of presenting to the office to address urgent/immediate medical needs. Patient is aware this is a billable service. She acknowledged consent and understanding of privacy and security of the video platform. The patient has agreed to participate and understands they can discontinue the visit at any time.    After connecting through [employer], the patient was identified by name and date of birth. Rhonda Marx was informed that this was a telemedicine visit and that the exam was being conducted confidentially over secure lines. Rhonda Marx acknowledged consent and understanding of privacy and security of the telemedicine visit. I informed the patient that I have reviewed her record in Epic and presented the opportunity for her to ask any questions regarding the visit today. The patient agreed to participate.     Verification of patient location:  Patient is located in the following state in which I hold an active license: [place]    Subjective:   Rhonda Marx is a 48 y.o. female who has been screened for COVID-19. Symptom change since last report: unchanged. Patient's symptoms include fatigue, malaise, nasal congestion, loss of taste, cough, chest tightness and nausea. Patient denies fever, chills, rhinorrhea, sore throat, anosmia, shortness of breath, abdominal pain, vomiting, diarrhea, myalgias and headaches.     - Date of symptom onset: 1/7/2024      COVID-19 vaccination status: Not vaccinated    Rhonda has been staying home and has isolated themselves in her home. She is taking care to not  "share personal items and is cleaning all surfaces that are touched often, like counters, tabletops, and doorknobs using household cleaning sprays or wipes. She is wearing a mask when she leaves her room.     No results found for: \"SARSCOV2\", \"[id number]\", \"SARSCORONAVI\", \"CORONAVIRUSR\", \"SARSCOVAG\", \"SARSCOVAGH\"    Review of Systems   Constitutional:  Positive for fatigue. Negative for chills and fever.   HENT:  Positive for congestion. Negative for rhinorrhea and sore throat.    Eyes: Negative.    Respiratory:  Positive for cough and chest tightness. Negative for shortness of breath.    Cardiovascular: Negative.    Gastrointestinal:  Positive for nausea. Negative for abdominal pain, diarrhea and vomiting.   Endocrine: Negative.    Genitourinary: Negative.    Musculoskeletal: Negative.  Negative for myalgias.   Skin: Negative.    Allergic/Immunologic: Negative.    Neurological: Negative.  Negative for headaches.   Hematological: Negative.    Psychiatric/Behavioral: Negative.       Current Outpatient Medications on File Prior to Visit   Medication Sig   • Bioflavonoid Products (VITAMIN C PLUS PO) Take by mouth   • Calcium 200 MG TABS Take by mouth   • celecoxib (CeleBREX) 200 mg capsule TAKE ONE CAPSULE BY MOUTH EVERY DAY   • cholecalciferol (VITAMIN D3) 1,000 units tablet Take 1 tablet (1,000 Units total) by mouth daily   • clobetasol (TEMOVATE) 0.05 % external solution    • Carole 0.1 MG/24HR    • metoprolol tartrate (LOPRESSOR) 25 mg tablet    • [] nirmatrelvir & ritonavir (Paxlovid, 300/100,) tablet therapy pack Take 3 tablets by mouth 2 (two) times a day for 5 days Take 2 nirmatrelvir tablets + 1 ritonavir tablet together per dose   • Probiotic Product (PROBIOTIC-10 PO) Take by mouth   • SELENIUM PO Take by mouth   • Zinc Sulfate (ZINC 15 PO) Take by mouth       Objective:    There were no vitals taken for this visit.       Physical Exam  Vitals and nursing note reviewed.   Constitutional:       General: " She is not in acute distress.     Appearance: Normal appearance.   HENT:      Head: Normocephalic and atraumatic.   Eyes:      General:         Right eye: No discharge.         Left eye: No discharge.      Conjunctiva/sclera: Conjunctivae normal.   Pulmonary:      Effort: Pulmonary effort is normal.   Skin:     General: Skin is warm and dry.   Neurological:      General: No focal deficit present.      Mental Status: She is alert.   Psychiatric:         Mood and Affect: Mood normal.         Behavior: Behavior normal.         Thought Content: Thought content normal.         Judgment: Judgment normal.       Suzette Palumbo PA-C

## 2024-01-15 NOTE — PATIENT INSTRUCTIONS
Problem List Items Addressed This Visit          Other    COVID-19 virus infection - Primary     Day 8 of symptoms. S/P Paxlovid. Pt. was supposed to go back to work tomorrow but not feeling well. Still staying in her room. This is the 3rd time she has had covid and the worst per her report. Prednisone taper as directed and phenergan DM as needed. Note to RTW Wed. Continue fluids, deep breaths, vitamins and side sleeping.          Relevant Medications    predniSONE 10 mg tablet    promethazine-dextromethorphan (PHENERGAN-DM) 6.25-15 mg/5 mL oral syrup

## 2024-01-15 NOTE — LETTER
January 15, 2024     Patient: Rhonda Marx   YOB: 1975   Date of Visit: 1/15/2024       To Whom It May Concern:    It is my medical opinion that Rhonda Marx may return to work on 1/17/2024 .    If you have any questions or concerns, please don't hesitate to call.         Sincerely,        Suzette Palumbo PA-C    CC: No Recipients

## 2024-01-15 NOTE — ASSESSMENT & PLAN NOTE
Day 8 of symptoms. S/P Paxlovid. Pt. was supposed to go back to work tomorrow but not feeling well. Still staying in her room. This is the 3rd time she has had covid and the worst per her report. Prednisone taper as directed and phenergan DM as needed. Note to RTW Wed. Continue fluids, deep breaths, vitamins and side sleeping.

## 2024-01-19 ENCOUNTER — OFFICE VISIT (OUTPATIENT)
Dept: GYNECOLOGIC ONCOLOGY | Facility: CLINIC | Age: 49
End: 2024-01-19
Payer: COMMERCIAL

## 2024-01-19 VITALS
BODY MASS INDEX: 30.13 KG/M2 | SYSTOLIC BLOOD PRESSURE: 120 MMHG | TEMPERATURE: 98.2 F | RESPIRATION RATE: 16 BRPM | HEIGHT: 66 IN | DIASTOLIC BLOOD PRESSURE: 74 MMHG | WEIGHT: 187.5 LBS | OXYGEN SATURATION: 98 % | HEART RATE: 99 BPM

## 2024-01-19 DIAGNOSIS — D07.1 VULVAR INTRAEPITHELIAL NEOPLASIA (VIN) GRADE 3: Primary | ICD-10-CM

## 2024-01-19 PROCEDURE — 56820 COLPOSCOPY VULVA: CPT | Performed by: OBSTETRICS & GYNECOLOGY

## 2024-01-19 NOTE — PROGRESS NOTES
Colposcopy     Date/Time  1/19/2024 3:45 PM     Universal Protocol   Consent: Verbal consent obtained.  Consent given by: patient  Patient identity confirmed: verbally with patient     Performed by  Aleksander Shaw MD   Authorized by  Aleksander Shaw MD     Pre-procedure details      Prepped with: acetic acid     Indication    Indications: KHLOE 3.   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  Final pathology was consistent with KHLOE 2/3 (negative margins).  Colposcopy today did not reveal any evidence of recurrent dysplasia or malignancy.  Her performance status is 0.  1.  Return in 6 months for vulvar colposcopy.  If colposcopy at that time is negative, she can follow-up with her gynecologist.

## 2024-01-19 NOTE — LETTER
January 19, 2024     Maximilian Denton, DO  [address] [facility]  [address]  [place] [address]    Patient: Rhonda Marx   YOB: 1975   Date of Visit: 1/19/2024       Dear Dr. Denton:    Thank you for referring Rhonda Marx to me for evaluation. Below are my notes for this consultation.    If you have questions, please do not hesitate to call me. I look forward to following your patient along with you.         Sincerely,        Aleksander Shaw MD        CC: No Recipients    Aleksander Shaw MD  1/19/2024  3:39 PM  Sign when Signing Visit     Colposcopy     Date/Time  1/19/2024 3:45 PM     Universal Protocol   Consent: Verbal consent obtained.  Consent given by: patient  Patient identity confirmed: verbally with patient     Performed by  Aleksander Shaw MD   Authorized by  Aleksander Shaw MD     Pre-procedure details      Prepped with: acetic acid     Indication    Indications: KHLOE 3.   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  Final pathology was consistent with KHLOE 2/3 (negative margins).  Colposcopy today did not reveal any evidence of recurrent dysplasia or malignancy.  Her performance status is 0.  1.  Return in 6 months for vulvar colposcopy.  If colposcopy at that time is negative, she can follow-up with her gynecologist.

## 2024-01-19 NOTE — ASSESSMENT & PLAN NOTE
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  Final pathology was consistent with KHLOE 2/3 (negative margins).  Colposcopy today did not reveal any evidence of recurrent dysplasia or malignancy.  Her performance status is 0.  1.  Return in 6 months for vulvar colposcopy.  If colposcopy at that time is negative, she can follow-up with her gynecologist.

## 2024-01-26 ENCOUNTER — HOSPITAL ENCOUNTER (OUTPATIENT)
Dept: RADIOLOGY | Age: 49
Discharge: HOME | End: 2024-01-26
Payer: COMMERCIAL

## 2024-01-26 VITALS — BODY MASS INDEX: 30.05 KG/M2 | WEIGHT: 187 LBS | HEIGHT: 66 IN

## 2024-01-26 DIAGNOSIS — Z12.31 ENCOUNTER FOR SCREENING MAMMOGRAM FOR BREAST CANCER: ICD-10-CM

## 2024-01-26 PROCEDURE — 77067 SCR MAMMO BI INCL CAD: CPT

## 2024-01-26 PROCEDURE — 77063 BREAST TOMOSYNTHESIS BI: CPT

## 2024-04-22 DIAGNOSIS — M79.10 MYALGIA: ICD-10-CM

## 2024-04-23 RX ORDER — CELECOXIB 200 MG/1
200 CAPSULE ORAL DAILY
Qty: 90 CAPSULE | Refills: 0 | Status: SHIPPED | OUTPATIENT
Start: 2024-04-23

## 2024-07-16 DIAGNOSIS — M79.10 MYALGIA: ICD-10-CM

## 2024-07-17 RX ORDER — CELECOXIB 200 MG/1
200 CAPSULE ORAL DAILY
Qty: 100 CAPSULE | Refills: 1 | Status: SHIPPED | OUTPATIENT
Start: 2024-07-17

## 2024-07-19 ENCOUNTER — OFFICE VISIT (OUTPATIENT)
Dept: GYNECOLOGIC ONCOLOGY | Facility: CLINIC | Age: 49
End: 2024-07-19
Payer: COMMERCIAL

## 2024-07-19 VITALS
HEIGHT: 66 IN | HEART RATE: 76 BPM | RESPIRATION RATE: 18 BRPM | BODY MASS INDEX: 29.41 KG/M2 | DIASTOLIC BLOOD PRESSURE: 78 MMHG | TEMPERATURE: 97.3 F | SYSTOLIC BLOOD PRESSURE: 118 MMHG | WEIGHT: 183 LBS | OXYGEN SATURATION: 98 %

## 2024-07-19 DIAGNOSIS — D07.1 VULVAR INTRAEPITHELIAL NEOPLASIA (VIN) GRADE 3: Primary | ICD-10-CM

## 2024-07-19 PROCEDURE — 56821 COLPOSCOPY VULVA W/BIOPSY: CPT | Performed by: OBSTETRICS & GYNECOLOGY

## 2024-07-19 PROCEDURE — 88344 IMHCHEM/IMCYTCHM EA MLT ANTB: CPT | Performed by: PATHOLOGY

## 2024-07-19 PROCEDURE — 88312 SPECIAL STAINS GROUP 1: CPT | Performed by: PATHOLOGY

## 2024-07-19 PROCEDURE — 88305 TISSUE EXAM BY PATHOLOGIST: CPT | Performed by: PATHOLOGY

## 2024-07-19 PROCEDURE — 88341 IMHCHEM/IMCYTCHM EA ADD ANTB: CPT | Performed by: PATHOLOGY

## 2024-07-19 PROCEDURE — 88342 IMHCHEM/IMCYTCHM 1ST ANTB: CPT | Performed by: PATHOLOGY

## 2024-07-19 NOTE — PROGRESS NOTES
Colposcopy     Date/Time  7/19/2024 3:30 PM     Universal Protocol   Consent: Verbal consent obtained.  Consent given by: patient  Patient understanding: patient states understanding of the procedure being performed  Patient identity confirmed: verbally with patient     Performed by  Aleksander Shaw MD   Authorized by  Aleksander Shaw MD     Pre-procedure details      Prepped with: acetic acid      Local anesthetic:  Lidocaine 1% w/o epi   Indication    Indications: KHLOE 3.   Procedure Details   Procedure: Colposcopy of Vulva with Biopsy      Biopsy(s): yes      Location:  Right vulva 3 o'clock    Specimen to pathology: yes     Post-procedure      Findings: White epithelium      Findings comment:  White epithelium at 3 o'clock, near clitoris on right    Impression comment:  KHLOE 1-2    Patient tolerance of procedure:  Tolerated well, no immediate complications   Comments       5% acetic acid applied to vulvar skin. Lesion on right hemivulva biopsied with 3mm punch. Hemostasis achieved with 2 interrupted 3-0 vicryl sutures.

## 2024-07-24 PROCEDURE — 88341 IMHCHEM/IMCYTCHM EA ADD ANTB: CPT | Performed by: PATHOLOGY

## 2024-07-24 PROCEDURE — 88342 IMHCHEM/IMCYTCHM 1ST ANTB: CPT | Performed by: PATHOLOGY

## 2024-07-24 PROCEDURE — 88312 SPECIAL STAINS GROUP 1: CPT | Performed by: PATHOLOGY

## 2024-07-24 PROCEDURE — 88305 TISSUE EXAM BY PATHOLOGIST: CPT | Performed by: PATHOLOGY

## 2024-07-24 PROCEDURE — 88344 IMHCHEM/IMCYTCHM EA MLT ANTB: CPT | Performed by: PATHOLOGY

## 2024-07-30 DIAGNOSIS — B00.9 HSV (HERPES SIMPLEX VIRUS) INFECTION: Primary | ICD-10-CM

## 2024-07-30 RX ORDER — VALACYCLOVIR HYDROCHLORIDE 1 G/1
1000 TABLET, FILM COATED ORAL DAILY
Qty: 5 TABLET | Refills: 3 | Status: SHIPPED | OUTPATIENT
Start: 2024-07-30 | End: 2024-08-04

## 2024-08-29 DIAGNOSIS — Z78.0 POST-MENOPAUSE: ICD-10-CM

## 2024-08-29 DIAGNOSIS — E55.9 VITAMIN D DEFICIENCY: ICD-10-CM

## 2024-08-29 DIAGNOSIS — N39.41 URGE INCONTINENCE OF URINE: ICD-10-CM

## 2024-08-29 DIAGNOSIS — E05.90 THYROTOXICOSIS WITHOUT THYROID STORM, UNSPECIFIED THYROTOXICOSIS TYPE: ICD-10-CM

## 2024-08-29 DIAGNOSIS — E16.2 HYPOGLYCEMIA: ICD-10-CM

## 2024-08-29 DIAGNOSIS — M79.10 MYALGIA: ICD-10-CM

## 2024-08-29 DIAGNOSIS — K58.9 IRRITABLE BOWEL SYNDROME, UNSPECIFIED TYPE: ICD-10-CM

## 2024-08-29 DIAGNOSIS — E53.8 B12 DEFICIENCY: Primary | ICD-10-CM

## 2024-08-29 DIAGNOSIS — E78.2 MIXED HYPERLIPIDEMIA: ICD-10-CM

## 2024-08-29 DIAGNOSIS — E04.1 NONTOXIC SINGLE THYROID NODULE: ICD-10-CM

## 2024-09-03 ENCOUNTER — APPOINTMENT (OUTPATIENT)
Dept: LAB | Facility: CLINIC | Age: 49
End: 2024-09-03
Payer: COMMERCIAL

## 2024-09-03 DIAGNOSIS — E05.90 THYROTOXICOSIS WITHOUT THYROID STORM, UNSPECIFIED THYROTOXICOSIS TYPE: ICD-10-CM

## 2024-09-03 DIAGNOSIS — N39.41 URGE INCONTINENCE OF URINE: ICD-10-CM

## 2024-09-03 DIAGNOSIS — Z78.0 POST-MENOPAUSE: ICD-10-CM

## 2024-09-03 DIAGNOSIS — E04.1 NONTOXIC SINGLE THYROID NODULE: ICD-10-CM

## 2024-09-03 DIAGNOSIS — E53.8 B12 DEFICIENCY: ICD-10-CM

## 2024-09-03 DIAGNOSIS — E55.9 VITAMIN D DEFICIENCY: ICD-10-CM

## 2024-09-03 DIAGNOSIS — E78.2 MIXED HYPERLIPIDEMIA: ICD-10-CM

## 2024-09-03 DIAGNOSIS — E16.2 HYPOGLYCEMIA: ICD-10-CM

## 2024-09-03 LAB
25(OH)D3 SERPL-MCNC: 110.1 NG/ML (ref 30–100)
ALBUMIN SERPL BCG-MCNC: 4.1 G/DL (ref 3.5–5)
ALP SERPL-CCNC: 55 U/L (ref 34–104)
ALT SERPL W P-5'-P-CCNC: 14 U/L (ref 7–52)
ANION GAP SERPL CALCULATED.3IONS-SCNC: 10 MMOL/L (ref 4–13)
AST SERPL W P-5'-P-CCNC: 18 U/L (ref 13–39)
BASOPHILS # BLD AUTO: 0.03 THOUSANDS/ÂΜL (ref 0–0.1)
BASOPHILS NFR BLD AUTO: 1 % (ref 0–1)
BILIRUB SERPL-MCNC: 0.46 MG/DL (ref 0.2–1)
BILIRUB UR QL STRIP: NEGATIVE
BUN SERPL-MCNC: 13 MG/DL (ref 5–25)
CALCIUM SERPL-MCNC: 9.2 MG/DL (ref 8.4–10.2)
CHLORIDE SERPL-SCNC: 104 MMOL/L (ref 96–108)
CHOLEST SERPL-MCNC: 165 MG/DL
CLARITY UR: CLEAR
CO2 SERPL-SCNC: 26 MMOL/L (ref 21–32)
COLOR UR: YELLOW
CREAT SERPL-MCNC: 0.58 MG/DL (ref 0.6–1.3)
CRP SERPL HS-MCNC: 1.79 MG/L
EOSINOPHIL # BLD AUTO: 0.74 THOUSAND/ÂΜL (ref 0–0.61)
EOSINOPHIL NFR BLD AUTO: 12 % (ref 0–6)
ERYTHROCYTE [DISTWIDTH] IN BLOOD BY AUTOMATED COUNT: 11.9 % (ref 11.6–15.1)
EST. AVERAGE GLUCOSE BLD GHB EST-MCNC: 103 MG/DL
ESTRADIOL SERPL-MCNC: 50.7 PG/ML
FERRITIN SERPL-MCNC: 60 NG/ML (ref 11–307)
FSH SERPL-ACNC: 30 MIU/ML
GFR SERPL CREATININE-BSD FRML MDRD: 107 ML/MIN/1.73SQ M
GLUCOSE P FAST SERPL-MCNC: 88 MG/DL (ref 65–99)
GLUCOSE UR STRIP-MCNC: NEGATIVE MG/DL
HBA1C MFR BLD: 5.2 %
HCT VFR BLD AUTO: 38.9 % (ref 34.8–46.1)
HCYS SERPL-SCNC: 7.8 UMOL/L (ref 5–15)
HDLC SERPL-MCNC: 59 MG/DL
HGB BLD-MCNC: 13.2 G/DL (ref 11.5–15.4)
HGB UR QL STRIP.AUTO: NEGATIVE
IMM GRANULOCYTES # BLD AUTO: 0.03 THOUSAND/UL (ref 0–0.2)
IMM GRANULOCYTES NFR BLD AUTO: 1 % (ref 0–2)
INSULIN SERPL-ACNC: 5.98 UIU/ML (ref 1.9–23)
IRON SATN MFR SERPL: 32 % (ref 15–50)
IRON SERPL-MCNC: 106 UG/DL (ref 50–212)
KETONES UR STRIP-MCNC: NEGATIVE MG/DL
LDLC SERPL CALC-MCNC: 92 MG/DL (ref 0–100)
LEUKOCYTE ESTERASE UR QL STRIP: NEGATIVE
LH SERPL-ACNC: 26.6 MIU/ML
LYMPHOCYTES # BLD AUTO: 1.63 THOUSANDS/ÂΜL (ref 0.6–4.47)
LYMPHOCYTES NFR BLD AUTO: 27 % (ref 14–44)
MCH RBC QN AUTO: 32.5 PG (ref 26.8–34.3)
MCHC RBC AUTO-ENTMCNC: 33.9 G/DL (ref 31.4–37.4)
MCV RBC AUTO: 96 FL (ref 82–98)
MONOCYTES # BLD AUTO: 0.83 THOUSAND/ÂΜL (ref 0.17–1.22)
MONOCYTES NFR BLD AUTO: 14 % (ref 4–12)
NEUTROPHILS # BLD AUTO: 2.86 THOUSANDS/ÂΜL (ref 1.85–7.62)
NEUTS SEG NFR BLD AUTO: 45 % (ref 43–75)
NITRITE UR QL STRIP: NEGATIVE
NRBC BLD AUTO-RTO: 0 /100 WBCS
PH UR STRIP.AUTO: 6 [PH]
PLATELET # BLD AUTO: 235 THOUSANDS/UL (ref 149–390)
PMV BLD AUTO: 11.3 FL (ref 8.9–12.7)
POTASSIUM SERPL-SCNC: 3.7 MMOL/L (ref 3.5–5.3)
PROGEST SERPL-MCNC: 0.24 NG/ML
PROT SERPL-MCNC: 6.3 G/DL (ref 6.4–8.4)
PROT UR STRIP-MCNC: NEGATIVE MG/DL
RBC # BLD AUTO: 4.06 MILLION/UL (ref 3.81–5.12)
SODIUM SERPL-SCNC: 140 MMOL/L (ref 135–147)
SP GR UR STRIP.AUTO: 1.02 (ref 1–1.03)
T3FREE SERPL-MCNC: 3.44 PG/ML (ref 2.5–3.9)
T4 FREE SERPL-MCNC: 0.96 NG/DL (ref 0.61–1.12)
TIBC SERPL-MCNC: 327 UG/DL (ref 250–450)
TRIGL SERPL-MCNC: 69 MG/DL
TSH SERPL DL<=0.05 MIU/L-ACNC: 0.52 UIU/ML (ref 0.45–4.5)
UIBC SERPL-MCNC: 221 UG/DL (ref 155–355)
UROBILINOGEN UR STRIP-ACNC: <2 MG/DL
WBC # BLD AUTO: 6.12 THOUSAND/UL (ref 4.31–10.16)

## 2024-09-03 PROCEDURE — 81003 URINALYSIS AUTO W/O SCOPE: CPT

## 2024-09-03 PROCEDURE — 84402 ASSAY OF FREE TESTOSTERONE: CPT

## 2024-09-03 PROCEDURE — 86800 THYROGLOBULIN ANTIBODY: CPT

## 2024-09-03 PROCEDURE — 36415 COLL VENOUS BLD VENIPUNCTURE: CPT

## 2024-09-03 PROCEDURE — 84403 ASSAY OF TOTAL TESTOSTERONE: CPT

## 2024-09-03 PROCEDURE — 80053 COMPREHEN METABOLIC PANEL: CPT

## 2024-09-03 PROCEDURE — 84481 FREE ASSAY (FT-3): CPT

## 2024-09-03 PROCEDURE — 82627 DEHYDROEPIANDROSTERONE: CPT

## 2024-09-03 PROCEDURE — 84443 ASSAY THYROID STIM HORMONE: CPT

## 2024-09-03 PROCEDURE — 84439 ASSAY OF FREE THYROXINE: CPT

## 2024-09-03 PROCEDURE — 83550 IRON BINDING TEST: CPT

## 2024-09-03 PROCEDURE — 83090 ASSAY OF HOMOCYSTEINE: CPT

## 2024-09-03 PROCEDURE — 83036 HEMOGLOBIN GLYCOSYLATED A1C: CPT

## 2024-09-03 PROCEDURE — 85025 COMPLETE CBC W/AUTO DIFF WBC: CPT

## 2024-09-03 PROCEDURE — 84432 ASSAY OF THYROGLOBULIN: CPT

## 2024-09-03 PROCEDURE — 82306 VITAMIN D 25 HYDROXY: CPT

## 2024-09-03 PROCEDURE — 83525 ASSAY OF INSULIN: CPT

## 2024-09-03 PROCEDURE — 82728 ASSAY OF FERRITIN: CPT

## 2024-09-03 PROCEDURE — 83002 ASSAY OF GONADOTROPIN (LH): CPT

## 2024-09-03 PROCEDURE — 84144 ASSAY OF PROGESTERONE: CPT

## 2024-09-03 PROCEDURE — 86141 C-REACTIVE PROTEIN HS: CPT

## 2024-09-03 PROCEDURE — 80061 LIPID PANEL: CPT

## 2024-09-03 PROCEDURE — 83540 ASSAY OF IRON: CPT

## 2024-09-03 PROCEDURE — 83001 ASSAY OF GONADOTROPIN (FSH): CPT

## 2024-09-03 PROCEDURE — 82670 ASSAY OF TOTAL ESTRADIOL: CPT

## 2024-09-03 PROCEDURE — 86376 MICROSOMAL ANTIBODY EACH: CPT

## 2024-09-04 LAB
DHEA-S SERPL-MCNC: 149 UG/DL (ref 41.2–243.7)
TESTOST FREE SERPL-MCNC: 1.4 PG/ML (ref 0–4.2)
TESTOST SERPL-MCNC: 23 NG/DL (ref 4–50)
THYROGLOB AB SERPL-ACNC: <1 IU/ML (ref 0–0.9)
THYROGLOB SERPL-MCNC: 8.9 NG/ML (ref 1.5–38.5)
THYROPEROXIDASE AB SERPL-ACNC: 26 IU/ML (ref 0–34)

## 2024-10-28 ENCOUNTER — OFFICE VISIT (OUTPATIENT)
Dept: GYNECOLOGIC ONCOLOGY | Facility: CLINIC | Age: 49
End: 2024-10-28
Payer: COMMERCIAL

## 2024-10-28 VITALS
WEIGHT: 188 LBS | BODY MASS INDEX: 30.34 KG/M2 | TEMPERATURE: 99.1 F | DIASTOLIC BLOOD PRESSURE: 68 MMHG | OXYGEN SATURATION: 98 % | SYSTOLIC BLOOD PRESSURE: 118 MMHG | HEART RATE: 107 BPM

## 2024-10-28 DIAGNOSIS — D07.1 VULVAR INTRAEPITHELIAL NEOPLASIA (VIN) GRADE 3: Primary | ICD-10-CM

## 2024-10-28 PROCEDURE — 56820 COLPOSCOPY VULVA: CPT | Performed by: OBSTETRICS & GYNECOLOGY

## 2024-10-28 NOTE — PROGRESS NOTES
Colposcopy    Date/Time: 10/28/2024 3:15 PM    Performed by: Aleksander Shaw MD  Authorized by: Aleksander Shaw MD    Verbal consent obtained?: Yes    Consent given by:  Patient  Patient states understanding of procedure being performed: Yes    Patient identity confirmed:  Verbally with patient  Pre-procedure:     Prepped with: acetic acid    Indication:     Indications: Khloe 3.  Procedure:     Procedure: Colposcopy of Vulva only      Specimen(s) to pathology: no    Post-procedure:     Findings comment:  No evidence of dysplasia    Impression comment:  No dysplasia or malignancy    Patient tolerance of procedure:  Tolerated well, no immediate complications    49-year-old with biopsy-proven KHLOE 3 status post CO2 laser ablation and simple vulvectomy 5/26/2023.  Final pathology was consistent with KHLOE 2/3, negative margins.  Colposcopy today did not reveal any evidence of recurrent dysplasia or malignancy.  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  She is clinically without evidence of recurrence of KHLOE 2/3 or HSV.  Her performance status is 0.  1.  She will follow-up with her gynecologist as scheduled.  She was encouraged to call our office with any additional questions or concerns.

## 2024-10-28 NOTE — ASSESSMENT & PLAN NOTE
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  Final pathology was consistent with KHLOE 2/3, negative margins.  Colposcopy today did not reveal any evidence of recurrent dysplasia or malignancy.  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  She is clinically without evidence of recurrence of KHLOE 2/3 or HSV.  Her performance status is 0.  1.  She will follow-up with her gynecologist as scheduled.  She was encouraged to call our office with any additional questions or concerns.

## 2024-10-28 NOTE — LETTER
October 28, 2024     Maximilian Denton, DO  [address] [facility]  [address]  [facility] [address]    Patient: Rhonda Marx   YOB: 1975   Date of Visit: 10/28/2024       Dear Dr. Denton:    Thank you for referring Rhonda Marx to me for evaluation. Below are my notes for this consultation.    If you have questions, please do not hesitate to call me. I look forward to following your patient along with you.         Sincerely,        Aleksander Shaw MD        CC: No Recipients    Aleksander Shaw MD  10/28/2024  3:44 PM  Sign when Signing Visit  Colposcopy    Date/Time: 10/28/2024 3:15 PM    Performed by: Aleksander Shaw MD  Authorized by: Aleksander Shaw MD    Verbal consent obtained?: Yes    Consent given by:  Patient  Patient states understanding of procedure being performed: Yes    Patient identity confirmed:  Verbally with patient  Pre-procedure:     Prepped with: acetic acid    Indication:     Indications: Marianne 3.  Procedure:     Procedure: Colposcopy of Vulva only      Specimen(s) to pathology: no    Post-procedure:     Findings comment:  No evidence of dysplasia    Impression comment:  No dysplasia or malignancy    Patient tolerance of procedure:  Tolerated well, no immediate complications    49-year-old with biopsy-proven MARIANNE 3 status post CO2 laser ablation and simple vulvectomy 5/26/2023.  Final pathology was consistent with MARIANNE 2/3, negative margins.  Colposcopy today did not reveal any evidence of recurrent dysplasia or malignancy.  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  She is clinically without evidence of recurrence of MARIANNE 2/3 or HSV.  Her performance status is 0.  1.  She will follow-up with her gynecologist as scheduled.  She was encouraged to call our office with any additional questions or concerns.

## 2024-10-30 ENCOUNTER — OFFICE VISIT (OUTPATIENT)
Dept: FAMILY MEDICINE CLINIC | Facility: CLINIC | Age: 49
End: 2024-10-30
Payer: COMMERCIAL

## 2024-10-30 VITALS
BODY MASS INDEX: 30.22 KG/M2 | HEART RATE: 89 BPM | HEIGHT: 66 IN | WEIGHT: 188 LBS | RESPIRATION RATE: 18 BRPM | OXYGEN SATURATION: 99 % | SYSTOLIC BLOOD PRESSURE: 128 MMHG | DIASTOLIC BLOOD PRESSURE: 76 MMHG

## 2024-10-30 DIAGNOSIS — E05.90 HYPERTHYROIDISM: ICD-10-CM

## 2024-10-30 DIAGNOSIS — E78.2 MIXED HYPERLIPIDEMIA: Primary | ICD-10-CM

## 2024-10-30 DIAGNOSIS — Z23 NEED FOR ZOSTER VACCINE: ICD-10-CM

## 2024-10-30 DIAGNOSIS — N39.41 URGE INCONTINENCE OF URINE: ICD-10-CM

## 2024-10-30 DIAGNOSIS — C44.92 SQUAMOUS CELL CARCINOMA OF SKIN: ICD-10-CM

## 2024-10-30 DIAGNOSIS — Z00.00 ANNUAL PHYSICAL EXAM: ICD-10-CM

## 2024-10-30 DIAGNOSIS — D07.1 VULVAR INTRAEPITHELIAL NEOPLASIA (VIN) GRADE 3: ICD-10-CM

## 2024-10-30 DIAGNOSIS — E55.9 VITAMIN D DEFICIENCY: ICD-10-CM

## 2024-10-30 DIAGNOSIS — Z23 NEED FOR INFLUENZA VACCINATION: ICD-10-CM

## 2024-10-30 DIAGNOSIS — Z79.890 POSTMENOPAUSAL HRT (HORMONE REPLACEMENT THERAPY): ICD-10-CM

## 2024-10-30 DIAGNOSIS — F17.200 TOBACCO USE DISORDER: ICD-10-CM

## 2024-10-30 DIAGNOSIS — Z23 NEED FOR COVID-19 VACCINE: ICD-10-CM

## 2024-10-30 PROBLEM — Z98.890 POSTOPERATIVE STATE: Status: RESOLVED | Noted: 2023-07-05 | Resolved: 2024-10-30

## 2024-10-30 PROBLEM — E53.8 B12 DEFICIENCY: Status: RESOLVED | Noted: 2023-01-03 | Resolved: 2024-10-30

## 2024-10-30 PROBLEM — M79.10 MYALGIA: Status: RESOLVED | Noted: 2023-01-03 | Resolved: 2024-10-30

## 2024-10-30 PROBLEM — U07.1 COVID-19 VIRUS INFECTION: Status: RESOLVED | Noted: 2023-03-29 | Resolved: 2024-10-30

## 2024-10-30 PROCEDURE — 99214 OFFICE O/P EST MOD 30 MIN: CPT | Performed by: FAMILY MEDICINE

## 2024-10-30 PROCEDURE — 99396 PREV VISIT EST AGE 40-64: CPT | Performed by: FAMILY MEDICINE

## 2024-10-30 NOTE — ASSESSMENT & PLAN NOTE
TSH was reasonable.  Remainder of the labs were also reasonable.  No change.  Would recommend yearly check at a minimum.  Consider endocrine follow-up at some point.  Orders:    TSH, 3rd generation; Future    Anti-thyroglobulin antibody; Future    Thyroglobulin; Future    T4, free; Future    T3, free; Future

## 2024-10-30 NOTE — PATIENT INSTRUCTIONS
"Patient Education     Routine physical for adults   The Basics   Written by the doctors and editors at [facility]   What is a physical? -- A physical is a routine visit, or \"check-up,\" with your doctor. You might also hear it called a \"wellness visit\" or \"preventive visit.\"  During each visit, the doctor will:   Ask about your physical and mental health   Ask about your habits, behaviors, and lifestyle   Do an exam   Give you vaccines if needed   Talk to you about any medicines you take   Give advice about your health   Answer your questions  Getting regular check-ups is an important part of taking care of your health. It can help your doctor find and treat any problems you have. But it's also important for preventing health problems.  A routine physical is different from a \"sick visit.\" A sick visit is when you see a doctor because of a health concern or problem. Since physicals are scheduled ahead of time, you can think about what you want to ask the doctor.  How often should I get a physical? -- It depends on your age and health. In general, for people age 21 years and older:   If you are younger than 50 years, you might be able to get a physical every 3 years.   If you are 50 years or older, your doctor might recommend a physical every year.  If you have an ongoing health condition, like diabetes or high blood pressure, your doctor will probably want to see you more often.  What happens during a physical? -- In general, each visit will include:   Physical exam - The doctor or nurse will check your height, weight, heart rate, and blood pressure. They will also look at your eyes and ears. They will ask about how you are feeling and whether you have any symptoms that bother you.   Medicines - It's a good idea to bring a list of all the medicines you take to each doctor visit. Your doctor will talk to you about your medicines and answer any questions. Tell them if you are having any side effects that bother you. You " "should also tell them if you are having trouble paying for any of your medicines.   Habits and behaviors - This includes:   Your diet   Your exercise habits   Whether you smoke, drink alcohol, or use drugs   Whether you are sexually active   Whether you feel safe at home  Your doctor will talk to you about things you can do to improve your health and lower your risk of health problems. They will also offer help and support. For example, if you want to quit smoking, they can give you advice and might prescribe medicines. If you want to improve your diet or get more physical activity, they can help you with this, too.   Lab tests, if needed - The tests you get will depend on your age and situation. For example, your doctor might want to check your:   Cholesterol   Blood sugar   Iron level   Vaccines - The recommended vaccines will depend on your age, health, and what vaccines you already had. Vaccines are very important because they can prevent certain serious or deadly infections.   Discussion of screening - \"Screening\" means checking for diseases or other health problems before they cause symptoms. Your doctor can recommend screening based on your age, risk, and preferences. This might include tests to check for:   Cancer, such as breast, prostate, cervical, ovarian, colorectal, prostate, lung, or skin cancer   Sexually transmitted infections, such as chlamydia and gonorrhea   Mental health conditions like depression and anxiety  Your doctor will talk to you about the different types of screening tests. They can help you decide which screenings to have. They can also explain what the results might mean.   Answering questions - The physical is a good time to ask the doctor or nurse questions about your health. If needed, they can refer you to other doctors or specialists, too.  Adults older than 65 years often need other care, too. As you get older, your doctor will talk to you about:   How to prevent falling at " home   Hearing or vision tests   Memory testing   How to take your medicines safely   Making sure that you have the help and support you need at home  All topics are updated as new evidence becomes available and our peer review process is complete.  This topic retrieved from [employer] on: May 02, 2024.  Topic 394301 Version 1.0  Release: 32.4.3 - C32.122  © 2024 UpToDate, Inc. and/or its affiliates. All rights reserved.  Consumer Information Use and Disclaimer   Disclaimer: This generalized information is a limited summary of diagnosis, treatment, and/or medication information. It is not meant to be comprehensive and should be used as a tool to help the user understand and/or assess potential diagnostic and treatment options. It does NOT include all information about conditions, treatments, medications, side effects, or risks that may apply to a specific patient. It is not intended to be medical advice or a substitute for the medical advice, diagnosis, or treatment of a health care provider based on the health care provider's examination and assessment of a patient's specific and unique circumstances. Patients must speak with a health care provider for complete information about their health, medical questions, and treatment options, including any risks or benefits regarding use of medications. This information does not endorse any treatments or medications as safe, effective, or approved for treating a specific patient. UpToDate, Inc. and its affiliates disclaim any warranty or liability relating to this information or the use thereof.The use of this information is governed by the Terms of Use, available at https://www.woltersTelelogosuwer.com/en/know/clinical-effectiveness-terms. 2024© UpToDate, Inc. and its affiliates and/or licensors. All rights reserved.  Copyright   © 2024 UpToDate, Inc. and/or its affiliates. All rights reserved.

## 2024-10-30 NOTE — PROGRESS NOTES
Adult Annual Physical  Name: Rhonda Marx      : 1975      MRN: [id number]  Encounter Provider: Brian Mena MD  Encounter Date: 10/30/2024   Encounter department: [facility]    Assessment & Plan  Mixed hyperlipidemia  Cholesterol is doing quite well.  HDL was quite good.  Would not recommend any changes.  Recheck in perhaps 1 year to 2 years.       Hyperthyroidism  TSH was reasonable.  Remainder of the labs were also reasonable.  No change.  Would recommend yearly check at a minimum.  Consider endocrine follow-up at some point.  Orders:    TSH, 3rd generation; Future    Anti-thyroglobulin antibody; Future    Thyroglobulin; Future    T4, free; Future    T3, free; Future    Squamous cell carcinoma of skin  Recommend yearly skin exam at a minimum.  This will be based on recommendations from dermatology.       Vulvar intraepithelial neoplasia (KHLOE) grade 3  Per GYN Oncology:  49-year-old with biopsy-proven KHLOE 3 status post CO2 laser ablation and simple vulvectomy 2023.  Final pathology was consistent with KHLOE 2/3, negative margins.  Colposcopy today did not reveal any evidence of recurrent dysplasia or malignancy.  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  She is clinically without evidence of recurrence of KHLOE 2/3 or HSV.  Her performance status is 0.  1.  She will follow-up with her gynecologist as scheduled.  She was encouraged to call our office with any additional questions or concerns.         Need for COVID-19 vaccine         Need for influenza vaccination         Need for zoster vaccine         Annual physical exam         Postmenopausal HRT (hormone replacement therapy)         Tobacco use disorder  Would recommend abstaining from tobacco products, including vapes.  Patient understands.         Urge incontinence of urine  Slightly better, minimal issues.  Mostly urgency at this point.         Vitamin D deficiency  Vitamin D level was indeed not  deficient.  She has stopped with supplementation.         Immunizations and preventive care screenings were discussed with patient today. Appropriate education was printed on patient's after visit summary.    Counseling:  Alcohol/drug use: discussed moderation in alcohol intake, the recommendations for healthy alcohol use, and avoidance of illicit drug use.  Dental Health: discussed importance of regular tooth brushing, flossing, and dental visits.  Injury prevention: discussed safety/seat belts, safety helmets, smoke detectors, carbon monoxide detectors, and smoking near bedding or upholstery.  Sexual health: discussed sexually transmitted diseases, partner selection, use of condoms, avoidance of unintended pregnancy, and contraceptive alternatives.  Exercise: the importance of regular exercise/physical activity was discussed. Recommend exercise 3-5 times per week for at least 30 minutes.            1. Mixed hyperlipidemia  Assessment & Plan:  Cholesterol is doing quite well.  HDL was quite good.  Would not recommend any changes.  Recheck in perhaps 1 year to 2 years.       2. Hyperthyroidism  Assessment & Plan:  TSH was reasonable.  Remainder of the labs were also reasonable.  No change.  Would recommend yearly check at a minimum.  Consider endocrine follow-up at some point.       Orders:  -     TSH, 3rd generation; Future; Expected date: 10/30/2025  -     Anti-thyroglobulin antibody; Future; Expected date: 10/30/2025  -     Thyroglobulin; Future; Expected date: 10/30/2025  -     T4, free; Future; Expected date: 10/30/2025  -     T3, free; Future; Expected date: 10/30/2025  3. Squamous cell carcinoma of skin  Assessment & Plan:  Recommend yearly skin exam at a minimum.  This will be based on recommendations from dermatology.       4. Vulvar intraepithelial neoplasia (KHLOE) grade 3  Assessment & Plan:  Per GYN Oncology:  49-year-old with biopsy-proven KHLOE 3 status post CO2 laser ablation and simple vulvectomy  5/26/2023.  Final pathology was consistent with KHLOE 2/3, negative margins.  Colposcopy today did not reveal any evidence of recurrent dysplasia or malignancy.  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  She is clinically without evidence of recurrence of KHLOE 2/3 or HSV.  Her performance status is 0.  1.  She will follow-up with her gynecologist as scheduled.  She was encouraged to call our office with any additional questions or concerns.         5. Need for COVID-19 vaccine  6. Need for influenza vaccination  7. Need for zoster vaccine  8. Annual physical exam  9. Postmenopausal HRT (hormone replacement therapy)  Comments:  On Carole.  Estrogen level was elevated.  This would be consistent with exogenous estrogen.  Could reduce slightly.  Speak with GYN.  10. Tobacco use disorder  Assessment & Plan:  Would recommend abstaining from tobacco products, including vapes.  Patient understands.         11. Urge incontinence of urine  Assessment & Plan:  Slightly better, minimal issues.  Mostly urgency at this point.         12. Vitamin D deficiency  Assessment & Plan:  Vitamin D level was indeed not deficient.  She has stopped with supplementation.             Chief Complaint   Patient presents with    Physical Exam         History of Present Illness     Adult Annual Physical:  Patient presents for annual physical. Patient is here to follow-up on multiple issues.    Reviewed her labs from September.  She has no particular concerns about those.    Reviewed about hormone replacement therapy for postmenopausal.  She is using a dot currently.    .     Diet and Physical Activity:  - Diet/Nutrition: well balanced diet, consuming 3-5 servings of fruits/vegetables daily, adequate fiber intake and adequate whole grain intake.  - Exercise: walking, 5-7 times a week on average and 30-60 minutes on average.    Depression Screening:  - PHQ-2 Score: 0    General Health:  - Sleep: sleeps well.  - Hearing: normal hearing bilateral  "ears.  - Vision: goes for regular eye exams. Reading glasses  - Dental: regular dental visits.    /GYN Health:  - Follows with GYN: yes.   - Menopause: postmenopausal.     Advanced Care Planning:  - Has an advanced directive?: no    - Has a durable medical POA?: no    - ACP document given to patient?: no      Review of Systems   Constitutional: Negative.    HENT: Negative.     Eyes: Negative.    Respiratory: Negative.     Cardiovascular: Negative.    Gastrointestinal: Negative.    Endocrine: Negative.    Genitourinary: Negative.    Musculoskeletal: Negative.    Skin: Negative.    Allergic/Immunologic: Negative.    Neurological: Negative.    Hematological: Negative.    Psychiatric/Behavioral: Negative.           Thyroglobulin JOY 8.9.  Thyroglobulin less than 1.  Thyroid microsomal antibody 26.  T4 Free: 0.96  T3 free: 3.44.  TSH 0.516.  Testosterone 23.  Free testosterone 1.4.  Progesterone 0.24.  Estradiol 58.7, LH 26.6  FSH 30.  DHEA-sulfate 149.  Homocystine 7.8.  CRP 1.79.    Iron panel normal  Insulin 5.98.  Urinalysis negative.  Vitamin D 110.1.  A1c 5.2.  Total cholesterol 165, LDL 92, HDL 59, triglycerides 69.  Sodium 140, potassium 3.7, calcium 9.2.  Blood sugar 88.  Creatinine 0.58, .  AST 18, ALT 14.  White count 6.12, hemoglobin 13.2, hematocrit 38.9, platelets 235.    Objective     /76 (BP Location: Right arm, Patient Position: Sitting, Cuff Size: Large)   Pulse 89   Resp 18   Ht 5' 6\" (1.676 m)   Wt 85.3 kg (188 lb)   SpO2 99%   BMI 30.34 kg/m²     Physical Exam  Vitals and nursing note reviewed.   Constitutional:       General: She is not in acute distress.     Appearance: She is well-developed. She is not diaphoretic.   HENT:      Head: Normocephalic and atraumatic.      Right Ear: Hearing, tympanic membrane, ear canal and external ear normal.      Left Ear: Hearing, tympanic membrane, ear canal and external ear normal.      Nose: Nose normal.      Right Sinus: No maxillary sinus " tenderness or frontal sinus tenderness.      Left Sinus: No maxillary sinus tenderness or frontal sinus tenderness.      Mouth/Throat:      Pharynx: Uvula midline. No oropharyngeal exudate.   Eyes:      General: Lids are everted, no foreign bodies appreciated.      Conjunctiva/sclera: Conjunctivae normal.      Pupils: Pupils are equal, round, and reactive to light.   Neck:      Thyroid: No thyromegaly.      Vascular: No carotid bruit.      Trachea: No tracheal deviation.   Cardiovascular:      Rate and Rhythm: Normal rate and regular rhythm.      Pulses:           Carotid pulses are 2+ on the right side and 2+ on the left side.     Heart sounds: Normal heart sounds. No murmur heard.     No friction rub. No gallop.   Pulmonary:      Effort: Pulmonary effort is normal. No respiratory distress.      Breath sounds: Normal breath sounds. No wheezing or rales.   Abdominal:      General: Bowel sounds are normal. There is no distension.      Palpations: Abdomen is soft.      Tenderness: There is no abdominal tenderness.   Musculoskeletal:      Cervical back: Normal range of motion and neck supple.   Lymphadenopathy:      Cervical: No cervical adenopathy.   Skin:     General: Skin is warm and dry.   Neurological:      Mental Status: She is alert and oriented to person, place, and time.      Coordination: Coordination normal.   Psychiatric:         Behavior: Behavior normal.         Thought Content: Thought content normal.         Judgment: Judgment normal.

## 2024-10-30 NOTE — ASSESSMENT & PLAN NOTE
1. Take Amoxicillin antibiotics as directed. 2. To help decrease any congestion and pressure in the ears, use saline spray in each nare and blow nose gently. 3. You may follow the saline spray with OTC Flonase, 1 spray in each nare daily. 4. You can use tylenol and motrin OTC for fever and pain. Use as directed. Patient's weight = 80 lbs   5. Follow up with your primary care physician in the next 2 weeks or sooner if symptoms worsen. 6. Seek medical attention sooner for worsening of symptoms despite treatment efforts, or the emergency room for the following non inclusive list of symptoms: uncontrolled fever/pain, drainage or bleeding from ears, inability to keep fluids down, shortness of breath or respiratory distress. Vitamin D level was indeed not deficient.  She has stopped with supplementation.

## 2024-10-30 NOTE — ASSESSMENT & PLAN NOTE
Cholesterol is doing quite well.  HDL was quite good.  Would not recommend any changes.  Recheck in perhaps 1 year to 2 years.

## 2024-10-30 NOTE — ASSESSMENT & PLAN NOTE
Recommend yearly skin exam at a minimum.  This will be based on recommendations from dermatology.

## 2024-10-30 NOTE — ASSESSMENT & PLAN NOTE
Per GYN Oncology:  49-year-old with biopsy-proven KHLOE 3 status post CO2 laser ablation and simple vulvectomy 5/26/2023.  Final pathology was consistent with KHLOE 2/3, negative margins.  Colposcopy today did not reveal any evidence of recurrent dysplasia or malignancy.  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  She is clinically without evidence of recurrence of KHLOE 2/3 or HSV.  Her performance status is 0.  1.  She will follow-up with her gynecologist as scheduled.  She was encouraged to call our office with any additional questions or concerns.

## 2025-01-24 ENCOUNTER — OFFICE VISIT (OUTPATIENT)
Dept: FAMILY MEDICINE CLINIC | Facility: CLINIC | Age: 50
End: 2025-01-24
Payer: COMMERCIAL

## 2025-01-24 VITALS
BODY MASS INDEX: 29.92 KG/M2 | SYSTOLIC BLOOD PRESSURE: 118 MMHG | WEIGHT: 186.2 LBS | OXYGEN SATURATION: 95 % | HEIGHT: 66 IN | HEART RATE: 73 BPM | DIASTOLIC BLOOD PRESSURE: 60 MMHG

## 2025-01-24 DIAGNOSIS — K58.9 IRRITABLE BOWEL SYNDROME, UNSPECIFIED TYPE: ICD-10-CM

## 2025-01-24 DIAGNOSIS — R19.7 DIARRHEA OF PRESUMED INFECTIOUS ORIGIN: Primary | ICD-10-CM

## 2025-01-24 DIAGNOSIS — D12.6 SERRATED ADENOMA OF COLON: ICD-10-CM

## 2025-01-24 PROCEDURE — 99214 OFFICE O/P EST MOD 30 MIN: CPT | Performed by: FAMILY MEDICINE

## 2025-01-24 NOTE — ASSESSMENT & PLAN NOTE
Would agree that she should follow-up with specialist.  Likely gastroenterology, but in this case with the serrated polyp from before, would also recommend colorectal surgery.  Will refer.

## 2025-01-24 NOTE — PATIENT INSTRUCTIONS
1. Diarrhea of presumed infectious origin  Comments:  Significant amount of diarrhea lately.  Persistent.  Check cultures.  No travel history.  Orders:  -     Stool Enteric Bacterial Panel by PCR; Future  -     Stool culture; Future  -     Giardia antigen; Future  -     Ambulatory Referral to Colorectal Surgery; Future  2. Serrated adenoma of colon  Assessment & Plan:  This is noted on appendectomy in September 2023.  Pathology did show serrated adenoma, but appeared to be completely contained within the specimen.  Margins were clear at that point based on pathology from [facility].  I would agree that she should follow-up with colorectal to reevaluate at this point.  Certainly, she would need a colonoscopy to confirm that there was not anything else and then follow-up based on that.  At the time, as she was having an emergent procedure, it was not reasonable to do a colonoscopy.  Orders:    Ambulatory Referral to Colorectal Surgery; Future    Orders:  -     Ambulatory Referral to Colorectal Surgery; Future  3. Irritable bowel syndrome, unspecified type  Assessment & Plan:  Would agree that she should follow-up with specialist.  Likely gastroenterology, but in this case with the serrated polyp from before, would also recommend colorectal surgery.  Will refer.           COVID 19 Instructions    Rhonda Marx was advised to limit contact with others to essential tasks such as getting food, medications, and medical care.    Proper handwashing reviewed, and Hand sanitzer when washing is not available.    If the patient develops symptoms of COVID 19, the patient should call the office as soon as possible.    It is strongly recommended that Flu Vaccinations be obtained.      Virtual Visits:  Asiya: This works on smart phones (any phone with Internet browsing capability).  You should get a text message when the provider is ready to see you.  Click on the link in the text message, and the call should start.  You will need to type  in your name, and allow camera and microphone access.  This is HIPPA compliant, and secure.      If you have not already done so, get immunized to COVID 19.      We are committed to getting you vaccinated as soon as possible and will be closely following CDC and [facility] guidelines as they are released and revised.  Please refer to our COVID-19 vaccine webpage for the most up to date information on the vaccine and our distribution efforts.    This site will also have the most up to date recommendations for COVID booster vaccine.    https://www.[facility].org/covid-19/protect-yourself/covid-19-vaccine    Call [phone] ([phone]), option 7    You can also visit https://www.vaccines.gov/ to find vaccines in your area.    OUR LOCATION:    [facility]  [address] [place] [address], [address]  [place], [place], [address] [phone]  Fax: [phone]    Lab services, Rheumatology, and OB/GYN are at this location as well.

## 2025-01-24 NOTE — PROGRESS NOTES
Name: Rhonda Marx      : 1975      MRN: [id number]  Encounter Provider: Brian Mena MD  Encounter Date: 2025   Encounter department: [facility]  :  Assessment & Plan  Diarrhea of presumed infectious origin    Orders:  •  Stool Enteric Bacterial Panel by PCR; Future  •  Stool culture; Future  •  Giardia antigen; Future  •  Ambulatory Referral to Colorectal Surgery; Future    Serrated adenoma of colon  This is noted on appendectomy in 2023.  Pathology did show serrated adenoma, but appeared to be completely contained within the specimen.  Margins were clear at that point based on pathology from [facility].  I would agree that she should follow-up with colorectal to reevaluate at this point.  Certainly, she would need a colonoscopy to confirm that there was not anything else and then follow-up based on that.  At the time, as she was having an emergent procedure, it was not reasonable to do a colonoscopy.  Orders:  •  Ambulatory Referral to Colorectal Surgery; Future    Irritable bowel syndrome, unspecified type  Would agree that she should follow-up with specialist.  Likely gastroenterology, but in this case with the serrated polyp from before, would also recommend colorectal surgery.  Will refer.             1. Diarrhea of presumed infectious origin  Comments:  Significant amount of diarrhea lately.  Persistent.  Check cultures.  No travel history.  Orders:  -     Stool Enteric Bacterial Panel by PCR; Future  -     Stool culture; Future  -     Giardia antigen; Future  -     Ambulatory Referral to Colorectal Surgery; Future  2. Serrated adenoma of colon  Assessment & Plan:  This is noted on appendectomy in 2023.  Pathology did show serrated adenoma, but appeared to be completely contained within the specimen.  Margins were clear at that point based on pathology from [facility].  I would agree that she should follow-up with colorectal to reevaluate at this point.   "Certainly, she would need a colonoscopy to confirm that there was not anything else and then follow-up based on that.  At the time, as she was having an emergent procedure, it was not reasonable to do a colonoscopy.  Orders:  •  Ambulatory Referral to Colorectal Surgery; Future    Orders:  -     Ambulatory Referral to Colorectal Surgery; Future  3. Irritable bowel syndrome, unspecified type  Assessment & Plan:  Would agree that she should follow-up with specialist.  Likely gastroenterology, but in this case with the serrated polyp from before, would also recommend colorectal surgery.  Will refer.           Chief Complaint   Patient presents with   • Follow-up     Watery diarrhea for over a week. Some nausea on and off. Tried OTC anti diarrhea.             History of Present Illness   Patient been having watery stools for about a week and 2 days now.  Some nausea, but not anything severe with that.  Patient does have a history of IBS, but she is not really having the other IBS symptoms that usually go along with her having loose stools.  No fevers, chills, aches.  She is fatigued, but she also is having poor sleep secondary to needing to have bowel movements in the middle of the night.  Patient has not had any recent travel.  Patient has city water.  She does have dogs.        Review of Systems    Objective   /60 (BP Location: Right arm, Patient Position: Sitting, Cuff Size: Standard)   Pulse 73   Ht 5' 6\" (1.676 m)   Wt 84.5 kg (186 lb 3.2 oz)   SpO2 95%   BMI 30.05 kg/m²      Physical Exam    "

## 2025-01-24 NOTE — ASSESSMENT & PLAN NOTE
This is noted on appendectomy in September 2023.  Pathology did show serrated adenoma, but appeared to be completely contained within the specimen.  Margins were clear at that point based on pathology from [facility].  I would agree that she should follow-up with colorectal to reevaluate at this point.  Certainly, she would need a colonoscopy to confirm that there was not anything else and then follow-up based on that.  At the time, as she was having an emergent procedure, it was not reasonable to do a colonoscopy.  Orders:  •  Ambulatory Referral to Colorectal Surgery; Future

## 2025-01-27 ENCOUNTER — APPOINTMENT (OUTPATIENT)
Dept: LAB | Facility: CLINIC | Age: 50
End: 2025-01-27
Payer: COMMERCIAL

## 2025-01-27 ENCOUNTER — TELEPHONE (OUTPATIENT)
Age: 50
End: 2025-01-27

## 2025-01-27 DIAGNOSIS — R19.7 DIARRHEA OF PRESUMED INFECTIOUS ORIGIN: ICD-10-CM

## 2025-01-27 PROCEDURE — 87505 NFCT AGENT DETECTION GI: CPT

## 2025-01-27 NOTE — TELEPHONE ENCOUNTER
Leon from [facility] called to request labs that may have completed within the last 90 days. Last labs completed on 9/3/24 which Leon stated may too old as they were looking for 90 days or at most within 4 months.

## 2025-01-28 ENCOUNTER — RESULTS FOLLOW-UP (OUTPATIENT)
Dept: FAMILY MEDICINE CLINIC | Facility: CLINIC | Age: 50
End: 2025-01-28

## 2025-01-28 LAB
C COLI+JEJUNI TUF STL QL NAA+PROBE: NEGATIVE
EC STX1+STX2 GENES STL QL NAA+PROBE: NEGATIVE
SALMONELLA SP SPAO STL QL NAA+PROBE: NEGATIVE
SHIGELLA SP+EIEC IPAH STL QL NAA+PROBE: NEGATIVE

## 2025-01-29 LAB — G LAMBLIA AG STL QL IA: NEGATIVE

## 2025-02-26 DIAGNOSIS — D12.6 SERRATED ADENOMA OF COLON: ICD-10-CM

## 2025-02-26 DIAGNOSIS — R19.7 DIARRHEA OF PRESUMED INFECTIOUS ORIGIN: Primary | ICD-10-CM

## 2025-02-26 DIAGNOSIS — K58.9 IRRITABLE BOWEL SYNDROME, UNSPECIFIED TYPE: ICD-10-CM

## 2025-03-06 DIAGNOSIS — M79.10 MYALGIA: ICD-10-CM

## 2025-03-07 RX ORDER — CELECOXIB 200 MG/1
200 CAPSULE ORAL DAILY
Qty: 100 CAPSULE | Refills: 1 | Status: SHIPPED | OUTPATIENT
Start: 2025-03-07

## 2025-07-29 ENCOUNTER — TELEPHONE (OUTPATIENT)
Age: 50
End: 2025-07-29

## 2025-07-30 ENCOUNTER — HOSPITAL ENCOUNTER (OUTPATIENT)
Dept: MAMMOGRAPHY | Facility: CLINIC | Age: 50
Discharge: HOME | End: 2025-07-30
Attending: OBSTETRICS & GYNECOLOGY
Payer: COMMERCIAL

## 2025-07-30 ENCOUNTER — HOSPITAL ENCOUNTER (OUTPATIENT)
Dept: ULTRASOUND IMAGING | Facility: CLINIC | Age: 50
Discharge: HOME | End: 2025-07-30
Attending: OBSTETRICS & GYNECOLOGY
Payer: COMMERCIAL

## 2025-07-30 VITALS — WEIGHT: 155 LBS | BODY MASS INDEX: 24.91 KG/M2 | HEIGHT: 66 IN

## 2025-07-30 DIAGNOSIS — N63.11 BREAST LUMP ON RIGHT SIDE AT 10 O'CLOCK POSITION: ICD-10-CM

## 2025-07-30 DIAGNOSIS — N63.20 UNSPECIFIED LUMP IN THE LEFT BREAST, UNSPECIFIED QUADRANT: ICD-10-CM

## 2025-07-30 DIAGNOSIS — N63.10 UNSPECIFIED LUMP IN THE RIGHT BREAST, UNSPECIFIED QUADRANT: ICD-10-CM

## 2025-07-30 DIAGNOSIS — Z12.31 ENCOUNTER FOR SCREENING MAMMOGRAM FOR MALIGNANT NEOPLASM OF BREAST: Primary | ICD-10-CM

## 2025-07-30 DIAGNOSIS — R92.333 HETEROGENEOUSLY DENSE TISSUE OF BOTH BREASTS ON MAMMOGRAPHY: ICD-10-CM

## 2025-07-30 PROCEDURE — G0279 TOMOSYNTHESIS, MAMMO: HCPCS

## 2025-07-30 PROCEDURE — 76642 ULTRASOUND BREAST LIMITED: CPT

## 2025-07-30 PROCEDURE — 77066 DX MAMMO INCL CAD BI: CPT

## (undated) DEVICE — BETHLEHEM UNIVERSAL MINOR VAG: Brand: CARDINAL HEALTH

## (undated) DEVICE — SUT MONOCRYL 4-0 PS-2 18 IN Y496G

## (undated) DEVICE — INTENDED FOR TISSUE SEPARATION, AND OTHER PROCEDURES THAT REQUIRE A SHARP SURGICAL BLADE TO PUNCTURE OR CUT.: Brand: BARD-PARKER SAFETY BLADES SIZE 15, STERILE

## (undated) DEVICE — CONNECTOR SIMS STRL

## (undated) DEVICE — TELFA NON-ADHERENT ABSORBENT DRESSING: Brand: TELFA

## (undated) DEVICE — PENCIL ELECTROSURG E-Z CLEAN -0035H

## (undated) DEVICE — SUT VICRYL 3-0 SH 27 IN J416H

## (undated) DEVICE — TUBING SUCTION 5MM X 12 FT

## (undated) DEVICE — PACK PBDS MAJOR GYN VAGINAL SLB

## (undated) DEVICE — GAUZE SPONGES,USP TYPE VII GAUZE, 12 PLY: Brand: CURITY

## (undated) DEVICE — STERILE 8 INCH PROCTO SWAB: Brand: CARDINAL HEALTH

## (undated) DEVICE — SYRINGE BULB 2 OZ

## (undated) DEVICE — SPECIMEN CONTAINER STERILE PEEL PACK

## (undated) DEVICE — GLOVE PI ULTRA TOUCH SZ.7.5

## (undated) DEVICE — SUT MONOCRYL PLUS 3-0 SH 27IN MCP316H

## (undated) DEVICE — FIBER ROBOTIC LASER BEAMPATH

## (undated) DEVICE — APPLICATOR 6 IN COTTON UNSTRL

## (undated) DEVICE — 3000CC GUARDIAN II: Brand: GUARDIAN

## (undated) DEVICE — GLOVE INDICATOR PI UNDERGLOVE SZ 8 BLUE

## (undated) DEVICE — MEDI-VAC YANK SUCT HNDL W/TPRD BULBOUS TIP: Brand: CARDINAL HEALTH